# Patient Record
Sex: FEMALE | Race: WHITE | NOT HISPANIC OR LATINO | Employment: OTHER | URBAN - METROPOLITAN AREA
[De-identification: names, ages, dates, MRNs, and addresses within clinical notes are randomized per-mention and may not be internally consistent; named-entity substitution may affect disease eponyms.]

---

## 2022-11-02 PROBLEM — G47.33 OBSTRUCTIVE SLEEP APNEA OF ADULT: Chronic | Status: ACTIVE | Noted: 2022-11-02

## 2022-11-02 PROBLEM — I10 HTN (HYPERTENSION): Status: ACTIVE | Noted: 2022-11-02

## 2022-11-02 PROBLEM — K76.0 FATTY LIVER: Status: ACTIVE | Noted: 2022-11-02

## 2022-11-02 PROBLEM — I10 HTN (HYPERTENSION): Chronic | Status: ACTIVE | Noted: 2022-11-02

## 2022-11-02 PROBLEM — I70.0 ATHEROSCLEROSIS OF AORTA (HCC): Status: ACTIVE | Noted: 2022-11-02

## 2022-11-02 PROBLEM — Z98.890 HISTORY OF EXTRACTION OF RENAL CALCULUS: Status: ACTIVE | Noted: 2022-11-02

## 2022-11-02 PROBLEM — Z01.84: Status: ACTIVE | Noted: 2022-11-02

## 2022-11-02 PROBLEM — E66.811 OBESITY (BMI 30.0-34.9): Status: ACTIVE | Noted: 2022-11-02

## 2022-11-02 PROBLEM — L30.4 INTERTRIGO: Status: ACTIVE | Noted: 2022-11-02

## 2022-11-02 PROBLEM — E11.9 DM (DIABETES MELLITUS), TYPE 2 (HCC): Status: ACTIVE | Noted: 2022-11-02

## 2022-11-02 PROBLEM — E55.9 VITAMIN D DEFICIENCY: Status: ACTIVE | Noted: 2022-11-02

## 2022-11-02 PROBLEM — R53.81 PHYSICAL DEBILITY: Status: ACTIVE | Noted: 2022-11-02

## 2022-11-02 PROBLEM — M85.80 OSTEOPENIA: Status: ACTIVE | Noted: 2022-11-02

## 2022-11-02 PROBLEM — M17.10 ARTHRITIS OF KNEE: Status: ACTIVE | Noted: 2022-11-02

## 2022-11-02 PROBLEM — K86.2 PANCREATIC CYST: Status: ACTIVE | Noted: 2022-11-02

## 2022-11-02 PROBLEM — E11.9 DM (DIABETES MELLITUS), TYPE 2 (HCC): Chronic | Status: ACTIVE | Noted: 2022-11-02

## 2022-11-02 PROBLEM — M79.2 NEURITIS: Status: ACTIVE | Noted: 2022-11-02

## 2022-11-02 PROBLEM — M20.10 ACQUIRED HALLUX VALGUS: Status: ACTIVE | Noted: 2022-11-02

## 2022-11-02 PROBLEM — R32 URINARY INCONTINENCE: Status: ACTIVE | Noted: 2022-11-02

## 2022-11-02 PROBLEM — Z85.3 HISTORY OF LEFT BREAST CANCER: Status: ACTIVE | Noted: 2022-11-02

## 2022-11-02 PROBLEM — E66.9 OBESITY (BMI 30.0-34.9): Status: ACTIVE | Noted: 2022-11-02

## 2022-11-02 PROBLEM — Z87.442 HISTORY OF EXTRACTION OF RENAL CALCULUS: Status: ACTIVE | Noted: 2022-11-02

## 2022-11-02 PROBLEM — G47.33 OBSTRUCTIVE SLEEP APNEA OF ADULT: Status: ACTIVE | Noted: 2022-11-02

## 2022-11-02 PROBLEM — F33.42 MAJOR DEPRESSIVE DISORDER, RECURRENT EPISODE, IN FULL REMISSION (HCC): Status: ACTIVE | Noted: 2022-11-02

## 2023-04-20 PROBLEM — F02.80 ALZHEIMER'S DISEASE (HCC): Status: ACTIVE | Noted: 2021-03-08

## 2023-04-20 PROBLEM — G30.9 ALZHEIMER'S DISEASE (HCC): Status: ACTIVE | Noted: 2021-03-08

## 2023-05-31 ENCOUNTER — HOSPITAL ENCOUNTER (OUTPATIENT)
Facility: MEDICAL CENTER | Age: 88
End: 2023-05-31
Attending: PHYSICIAN ASSISTANT
Payer: MEDICARE

## 2023-05-31 LAB
FORWARD REASON: SPWHY: NORMAL
FORWARDED TO LAB: SPWHR: NORMAL
SPECIMEN SENT: SPWT1: NORMAL

## 2023-06-10 ENCOUNTER — APPOINTMENT (OUTPATIENT)
Dept: RADIOLOGY | Facility: MEDICAL CENTER | Age: 88
End: 2023-06-10
Attending: EMERGENCY MEDICINE
Payer: MEDICARE

## 2023-06-10 ENCOUNTER — HOSPITAL ENCOUNTER (EMERGENCY)
Facility: MEDICAL CENTER | Age: 88
End: 2023-06-10
Attending: EMERGENCY MEDICINE
Payer: MEDICARE

## 2023-06-10 VITALS
BODY MASS INDEX: 36.31 KG/M2 | RESPIRATION RATE: 18 BRPM | WEIGHT: 173 LBS | TEMPERATURE: 97.5 F | DIASTOLIC BLOOD PRESSURE: 75 MMHG | SYSTOLIC BLOOD PRESSURE: 135 MMHG | HEIGHT: 58 IN | HEART RATE: 75 BPM | OXYGEN SATURATION: 97 %

## 2023-06-10 DIAGNOSIS — M25.551 RIGHT HIP PAIN: ICD-10-CM

## 2023-06-10 DIAGNOSIS — S12.041A CLOSED NONDISPLACED LATERAL MASS FRACTURE OF FIRST CERVICAL VERTEBRA, INITIAL ENCOUNTER (HCC): ICD-10-CM
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
## 2023-06-10 DIAGNOSIS — S20.211A CHEST WALL CONTUSION, RIGHT, INITIAL ENCOUNTER: ICD-10-CM

## 2023-06-10 PROCEDURE — 99284 EMERGENCY DEPT VISIT MOD MDM: CPT

## 2023-06-10 PROCEDURE — 73501 X-RAY EXAM HIP UNI 1 VIEW: CPT | Mod: RT

## 2023-06-10 PROCEDURE — 73700 CT LOWER EXTREMITY W/O DYE: CPT | Mod: RT

## 2023-06-10 PROCEDURE — 72125 CT NECK SPINE W/O DYE: CPT

## 2023-06-10 PROCEDURE — 70450 CT HEAD/BRAIN W/O DYE: CPT

## 2023-06-10 PROCEDURE — 71045 X-RAY EXAM CHEST 1 VIEW: CPT

## 2023-06-10 NOTE — ED PROVIDER NOTES
ED Provider Note    CHIEF COMPLAINT  Chief Complaint   Patient presents with    T-5000 GLF     PT resides at [facility], had witnessed, mechanical GLF. + Head strike, -LOC, -blood thinners. PT Aox2 which is baseline. FSBS 385 per EMS, hx DM2.       EXTERNAL RECORDS REVIEWED  Outpatient office visit 5/25/2023, routine checkup, history of Alzheimer's and breast cancer, medications include no anticoagulants    HPI/ROS  LIMITATION TO HISTORY   Select: History of dementia  OUTSIDE HISTORIAN(S):  EMS      Lindy Palmer is a 88 y.o. female who presents for evaluation status post witnessed ground-level fall, fell to the right side complaining of headache and neck pain.  EMS reports they also found her to have some right-sided chest wall tenderness and right hip pain.  She has a history of dementia residing at a memory care facility, oriented x2 at her baseline.  She is not on anticoagulants nor aspirin.  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    SURGICAL HISTORY  patient denies any surgical history    FAMILY HISTORY  No family history on file.    SOCIAL HISTORY  Social History     Tobacco Use    Smoking status: Unknown    Smokeless tobacco: Not on file   Substance and Sexual Activity    Alcohol use: Not on file    Drug use: Not on file    Sexual activity: Not on file       CURRENT  "MEDICATIONS  Home Medications    **Home medications have not yet been reviewed for this encounter**         ALLERGIES  Allergies   Allergen Reactions    Trazodone Palpitations       PHYSICAL EXAM  VITAL SIGNS: BP (!) 146/73   Pulse 84   Temp 36.1 °C (97 °F) (Temporal)   Resp 18   Ht 1.473 m (4' 10\")   Wt 78.5 kg (173 lb)   SpO2 92%   BMI 36.16 kg/m²   Constitutional: Awake, alert, no acute distress.  HENT: Head is atraumatic.  Neck: Collar is in place, has some midline tenderness  Thorax: No respiratory distress.  Lungs are clear to auscultation with symmetric air movement, regular cardiac rhythm.  On palpation she does have some tenderness of the right lateral chest wall but no crepitation.  Abdomen: Soft, with no tenderness.  Extremities/Musculoskeletal: No obvious sign of acute deformities.  She does have some pain in the right hip when attempting to move that right leg.  Neurovascular intact x4 extremities  Neurologic: Awake, alert.  Limited range of motion of all joints with generalized weakness, does seem to have right upper extremity weakness versus left    DIAGNOSTIC STUDIES / PROCEDURES    RADIOLOGY  I have independently interpreted the diagnostic imaging associated with this visit and am waiting the final reading from the radiologist.   My preliminary interpretation is as follows: No intracranial hemorrhage  Radiologist interpretation:   CT-HIP W/O PLUS RECONS RIGHT   Final Result      1.  No displaced pelvic or proximal femur fracture.   2.  Mild degenerative changes in the pelvis and lower lumbar spine.   3.  There is a fat-containing lower abdominal wall hernia.   4.  There is diverticulosis without diverticulitis.      DX-HIP-UNILATERAL-WITH PELVIS-1 VIEW RIGHT   Final Result      1.  Bones are osteopenic.   2.  No acute displaced fractures are identified.      DX-CHEST-PORTABLE (1 VIEW)   Final Result      1.  There is no acute cardiopulmonary process.      CT-CSPINE WITHOUT PLUS RECONS   Final " Result      1.  Nondisplaced fracture of the right lateral C1 arch.      2.  Multilevel degenerative disc disease and facet degeneration.      3.  This was discussed with ROJELIO HUANG at 2:35 PM on 6/10/2023.      CT-HEAD W/O   Final Result   Addendum (preliminary) 1 of 1   Addendum:   Discontinuity of the posterior right arch of C1 is noted, age    indeterminate. This will be discussed in further detail in the cervical    spine CT.      Final      1.  There is no acute intracranial hemorrhage or infarct.      2.  White matter lucencies most consistent with small vessel ischemic change versus demyelination or gliosis.      3.  There is cerebral atrophy.               COURSE & MEDICAL DECISION MAKING    ED Observation Status? No; Patient does not meet criteria for ED Observation.     INITIAL ASSESSMENT, COURSE AND PLAN  Care Narrative: 88-year-old history of dementia with a ground-level fall that was witnessed, head injury, also complaining of neck pain arrives in a c-collar.  A code TBI.  On exam also has right-sided chest wall tenderness and some painful range of motion of the right hip.  CT of the head and C-spine be obtained.  X-ray of the chest and hip will be obtained.  Ultimately she will require a tertiary examination to identify other injuries prior to disposition    CT of the head is negative.  CT of the C-spine reveals a nondisplaced unilateral fracture of C1.  I did again perform a neurologic semination and given the patient's limited range of motion of essentially all of her joints plus her generalized weakness is really hard to identify any obvious focal neurologic deficits although she does seem to have some right arm weakness.  I discussed the CT findings with Dr. Tse, is very unlikely that a fracture of that pattern would cause significant injury to his spinal cord.  He recommends 3 months of hard collar immobilization.  The meantime the x-ray of the chest and hip were unremarkable, given her  poor bone density noted on the x-ray a CT of the hip was obtained and this is negative for fracture.  Afterwards the patient was able to stand up on her own.  Hard collar has been placed by traction.  She will follow-up with the spine surgeon.  Discharged home in stable condition with strict return instructions provided      DISPOSITION AND DISCUSSIONS  I have discussed management of the patient with the following physicians and REN's: Dr. Tse, spine surgery    Escalation of care considered, and ultimately not performed:acute inpatient care management, however at this time, the patient is most appropriate for outpatient management    Decision tools and prescription drugs considered including, but not limited to: I did consider prescription for opiate analgesics although the patient seems quite comfortable at this time I think the risks outweigh the benefits.    FINAL DIAGNOSIS  1. Closed nondisplaced lateral mass fracture of first cervical vertebra, initial encounter ([facility])    2. Ground-level fall    3. Chest wall contusion, right, initial encounter    4. Right hip pain           Electronically signed by: Ozzy Naranjo M.D., 6/10/2023 2:39 PM

## 2023-06-10 NOTE — PROGRESS NOTES
Images reviewed, non-displaced C1 arch fracture, normal alignment including C1/2 lateral masses, recommend C-collar x 3 months and outpatient [facility] spine ([phone] for appt) at 1 and 3 months for serial clinical and xray evaluation followup.

## 2023-06-10 NOTE — ED TRIAGE NOTES
"Chief Complaint   Patient presents with    T-5000 GLF     PT resides at [facility], had witnessed, mechanical GLF. + Head strike, -LOC, -blood thinners. PT Aox2 which is baseline. FSBS 385 per EMS, hx DM2.       BIB EMS to [facility], pt on monitor, provided call bell.  Medications given en route: None    BP (!) 146/73   Pulse 84   Temp 36.1 °C (97 °F) (Temporal)   Resp 18   Ht 1.473 m (4' 10\")   Wt 78.5 kg (173 lb)   SpO2 92%   BMI 36.16 kg/m²     "

## 2023-06-11 NOTE — ED NOTES
Pt stood at bedside, pt reports no complaints. ERP made aware. Social work made aware of pts need for transfer to [facility].

## 2023-06-11 NOTE — ED NOTES
Pt ready for DC, family at bedside for transport home, pt to be transferred by wheelchair to exit, discharge questions answered

## 2023-06-11 NOTE — PROGRESS NOTES
Sent a fax to [employer] regarding the order for a custom C-collar, will try to expedite. Contact them at [phone].

## 2023-07-01 ENCOUNTER — HOSPITAL ENCOUNTER (EMERGENCY)
Facility: MEDICAL CENTER | Age: 88
End: 2023-07-01
Attending: EMERGENCY MEDICINE
Payer: MEDICARE

## 2023-07-01 VITALS
TEMPERATURE: 97.3 F | RESPIRATION RATE: 16 BRPM | DIASTOLIC BLOOD PRESSURE: 55 MMHG | HEIGHT: 58 IN | HEART RATE: 76 BPM | WEIGHT: 173.06 LBS | SYSTOLIC BLOOD PRESSURE: 109 MMHG | OXYGEN SATURATION: 93 % | BODY MASS INDEX: 36.33 KG/M2

## 2023-07-01 DIAGNOSIS — K62.5 BRBPR (BRIGHT RED BLOOD PER RECTUM): ICD-10-CM

## 2023-07-01 LAB
ALBUMIN SERPL BCP-MCNC: 4 G/DL (ref 3.2–4.9)
ALBUMIN/GLOB SERPL: 1.2 G/DL
ALP SERPL-CCNC: 79 U/L (ref 30–99)
ALT SERPL-CCNC: 22 U/L (ref 2–50)
ANION GAP SERPL CALC-SCNC: 16 MMOL/L (ref 7–16)
APTT PPP: 27.3 SEC (ref 24.7–36)
AST SERPL-CCNC: 22 U/L (ref 12–45)
BASOPHILS # BLD AUTO: 0.8 % (ref 0–1.8)
BASOPHILS # BLD: 0.07 K/UL (ref 0–0.12)
BILIRUB SERPL-MCNC: 0.7 MG/DL (ref 0.1–1.5)
BUN SERPL-MCNC: 14 MG/DL (ref 8–22)
CALCIUM ALBUM COR SERPL-MCNC: 9.7 MG/DL (ref 8.5–10.5)
CALCIUM SERPL-MCNC: 9.7 MG/DL (ref 8.5–10.5)
CHLORIDE SERPL-SCNC: 99 MMOL/L (ref 96–112)
CO2 SERPL-SCNC: 21 MMOL/L (ref 20–33)
CREAT SERPL-MCNC: 1.14 MG/DL (ref 0.5–1.4)
EOSINOPHIL # BLD AUTO: 0.14 K/UL (ref 0–0.51)
EOSINOPHIL NFR BLD: 1.6 % (ref 0–6.9)
ERYTHROCYTE [DISTWIDTH] IN BLOOD BY AUTOMATED COUNT: 41.5 FL (ref 35.9–50)
GFR SERPLBLD CREATININE-BSD FMLA CKD-EPI: 46 ML/MIN/1.73 M 2
GLOBULIN SER CALC-MCNC: 3.3 G/DL (ref 1.9–3.5)
GLUCOSE SERPL-MCNC: 287 MG/DL (ref 65–99)
HCT VFR BLD AUTO: 42.7 % (ref 37–47)
HGB BLD-MCNC: 14.9 G/DL (ref 12–16)
IMM GRANULOCYTES # BLD AUTO: 0.03 K/UL (ref 0–0.11)
IMM GRANULOCYTES NFR BLD AUTO: 0.3 % (ref 0–0.9)
INR PPP: 1.09 (ref 0.87–1.13)
LYMPHOCYTES # BLD AUTO: 1.81 K/UL (ref 1–4.8)
LYMPHOCYTES NFR BLD: 20.7 % (ref 22–41)
MCH RBC QN AUTO: 31.6 PG (ref 27–33)
MCHC RBC AUTO-ENTMCNC: 34.9 G/DL (ref 32.2–35.5)
MCV RBC AUTO: 90.7 FL (ref 81.4–97.8)
MONOCYTES # BLD AUTO: 0.65 K/UL (ref 0–0.85)
MONOCYTES NFR BLD AUTO: 7.4 % (ref 0–13.4)
NEUTROPHILS # BLD AUTO: 6.05 K/UL (ref 1.82–7.42)
NEUTROPHILS NFR BLD: 69.2 % (ref 44–72)
NRBC # BLD AUTO: 0 K/UL
NRBC BLD-RTO: 0 /100 WBC (ref 0–0.2)
PLATELET # BLD AUTO: 211 K/UL (ref 164–446)
PMV BLD AUTO: 10.8 FL (ref 9–12.9)
POTASSIUM SERPL-SCNC: 4.1 MMOL/L (ref 3.6–5.5)
PROT SERPL-MCNC: 7.3 G/DL (ref 6–8.2)
PROTHROMBIN TIME: 14 SEC (ref 12–14.6)
RBC # BLD AUTO: 4.71 M/UL (ref 4.2–5.4)
SODIUM SERPL-SCNC: 136 MMOL/L (ref 135–145)
WBC # BLD AUTO: 8.8 K/UL (ref 4.8–10.8)

## 2023-07-01 PROCEDURE — 85730 THROMBOPLASTIN TIME PARTIAL: CPT

## 2023-07-01 PROCEDURE — 80053 COMPREHEN METABOLIC PANEL: CPT

## 2023-07-01 PROCEDURE — 85610 PROTHROMBIN TIME: CPT

## 2023-07-01 PROCEDURE — 85025 COMPLETE CBC W/AUTO DIFF WBC: CPT

## 2023-07-01 PROCEDURE — 99284 EMERGENCY DEPT VISIT MOD MDM: CPT

## 2023-07-01 PROCEDURE — 94760 N-INVAS EAR/PLS OXIMETRY 1: CPT

## 2023-07-01 PROCEDURE — 36415 COLL VENOUS BLD VENIPUNCTURE: CPT

## 2023-07-01 NOTE — ED PROVIDER NOTES
ED Provider Note    CHIEF COMPLAINT  Chief Complaint   Patient presents with    Bloody Stools     Pt had bowel incontinence this morning with scant blood in it.         EXTERNAL RECORDS REVIEWED  Outpatient Notes the patient was seen in the emergency department on Whitney 10, 2023 and at that time was diagnosed after ground-level fall with a nondisplaced C1 fracture    HPI/ROS  LIMITATION TO HISTORY   Select: Forgetfulness  OUTSIDE HISTORIAN(S):  Staff at [facility]    Lindy Palmer is a 88 y.o. female who presents after being transferred from [facility], she had a bowel movement today with incontinence and there was a scant amount of blood in the feces.  The patient is a poor historian and does not know why she is here.  She denies any symptoms currently.  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    SURGICAL HISTORY  patient denies any surgical history    FAMILY HISTORY  No family history on file.    SOCIAL HISTORY  Social History     Tobacco Use    Smoking status: Unknown    Smokeless tobacco: Not on file   Substance and Sexual Activity    Alcohol use: Not on file    Drug use: Not on file    Sexual activity: Not on file       CURRENT MEDICATIONS     Start End   nystatin (MYCOSTATIN) powder 6/7/2023    Sig: Apply 1 g topically 3 times a day. Discontinue when rash is  resolved   Route: Topical   Cosign for Ordering: Accepted by Karen Kinsey P.A.-C. on 2023  3:23 PM   Number of times this order has been changed since signin     Order Audit Trail     Probiotic, Lactobacillus, Cap 2023   Sig: Take 1 Capsule by mouth 2 times a day for 30 days.   Route: Oral   Cosign for Ordering: Accepted by Karen Kinsey P.A.-C. on 2023  5:31 PM   Number of times this order has been changed since signin     Order Audit Trail     ondansetron (ZOFRAN) 4 MG Tab tablet 2023   Sig: Take 1 Tablet by mouth every 6 hours as needed for Nausea/Vomiting for up to 90 days.   Route: Oral   Number of times this order has been changed since signin     Order Audit Trail     loperamide (IMODIUM) 2 MG Cap 2023    Sig: Take 1 Capsule by mouth 4 times a day as needed for Diarrhea.   Route: Oral   Number of times this order has been changed since signin     Order Audit [place]     acetaminophen (TYLENOL) 500 MG Tab     Sig: Take 1,000 mg by mouth every 8 hours as needed for Mild Pain or Moderate Pain.   Class: Historical Med   Route: Oral   Calcium Carbonate Antacid (TUMS PO)     Sig: Take 2 Tablets by mouth every 12 hours as needed (dyspepsia).   Class: Historical Med   Route: Oral   lisinopril (PRINIVIL) 2.5 MG Tab 2023   Sig: Take 1 Tablet by mouth every day for 180 days.   Route: Oral   Number of times this order has been changed since signin     Order Audit [place]     Non Formulary Request     Sig: Take 1 Packet by mouth every day. Ningxia Red one 2oz packet PO daily. Family provides medication.   Class: Historical Med   Route: Oral   polyethylene glycol 3350 (MIRALAX) 17 GM/SCOOP Powder 2022    Sig: MIX 17G IN 8OZ OF H20 AND TAKE BY MOUTH EVERY DAY AS NEEDED FOR CONSTIPATION   Cosign for Ordering: Accepted by Karen Kinsey P.A.-C. on 2022  3:33 PM   Number of times this order has been changed since signin     Order Audit [place]    "  B Complex Vitamins (VITAMIN B COMPLEX) Tab     Sig: Take  by mouth. Take 1 by mouth daily   Class: Historical Med   Route: Oral   Ascorbic Acid (VITAMIN C) 1000 MG Tab     Sig: Take 1,000 mg by mouth. Take 1 by mouth daily   Class: Historical Med   Route: Oral   Cholecalciferol (VITAMIN D) 2000 UNIT Tab     Sig: Take  by mouth every day. Take 1 by mouth daily   Class: Historical Med   Route: Oral   Cananga Oil Fragrance (YLANG-YLANG OIL FRAGRANCE) Oil     Sig: Place 2 Drops on the skin two times a week.   Class: Historical Med   Route: Transdermal   Omega-3 Fatty Acids (OMEGA-3 EPA FISH OIL) 1205 MG Cap     Sig: Take 2 Capsules by mouth every day.   Class: Historical Med   Route: Oral         ALLERGIES  Allergies   Allergen Reactions    Trazodone Palpitations       PHYSICAL EXAM  VITAL SIGNS: /55   Pulse 79   Temp 36.7 °C (98 °F) (Temporal)   Resp 15   Ht 1.473 m (4' 9.99\")   Wt 78.5 kg (173 lb 1 oz)   SpO2 90%   BMI 36.18 kg/m²    Constitutional: Alert.  HENT: No signs of trauma, Bilateral external ears normal, Nose normal.   Eyes: Pupils are equal and reactive, Conjunctiva normal, Non-icteric.   Neck: Normal range of motion, No tenderness, Supple, No stridor.   Lymphatic: No lymphadenopathy noted.   Cardiovascular: Regular rate and rhythm, no murmurs.   Thorax & Lungs: Normal breath sounds, No respiratory distress, No wheezing, No chest tenderness.   Abdomen: Bowel sounds normal, Soft, No tenderness, No peritoneal signs, No masses, No pulsatile masses.   Skin: Warm, Dry, No erythema, No rash.   Back: No bony tenderness, No CVA tenderness.   Extremities: Intact distal pulses, No edema, No tenderness, No cyanosis.  Musculoskeletal: Good range of motion in all major joints. No tenderness to palpation or major deformities noted.   Neurologic: Alert , Normal motor function, Normal sensory function, No focal deficits noted.   Psychiatric: Affect normal, Judgment normal, Mood normal.       DIAGNOSTIC " STUDIES / PROCEDURES    LABS  Labs Reviewed   CBC WITH DIFFERENTIAL - Abnormal; Notable for the following components:       Result Value    Lymphocytes 20.70 (*)     All other components within normal limits    Narrative:     Indicate which anticoagulants the patient is on:->UNKNOWN   COMP METABOLIC PANEL - Abnormal; Notable for the following components:    Glucose 287 (*)     All other components within normal limits    Narrative:     Indicate which anticoagulants the patient is on:->UNKNOWN   ESTIMATED GFR - Abnormal; Notable for the following components:    GFR (CKD-EPI) 46 (*)     All other components within normal limits    Narrative:     Indicate which anticoagulants the patient is on:->UNKNOWN   PROTHROMBIN TIME    Narrative:     Indicate which anticoagulants the patient is on:->UNKNOWN   APTT    Narrative:     Indicate which anticoagulants the patient is on:->UNKNOWN   CORRECTED CALCIUM    Narrative:     Indicate which anticoagulants the patient is on:->UNKNOWN           COURSE & MEDICAL DECISION MAKING    ED Observation Status? Yes; I am placing the patient in to an observation status due to a diagnostic uncertainty as well as therapeutic intensity. Patient placed in observation status at 11:01 AM, 7/1/2023.     Observation plan is as follows: Patient presents with a history of a small amount of blood in her stool.  Labs were ordered to assess.  She will be monitored.    Upon Reevaluation, the patient's condition has: Improved; and will be discharged.    Patient discharged from ED Observation status at 11:54 AM (Time) July 1, 2023 (Date).     INITIAL ASSESSMENT, COURSE AND PLAN  Care Narrative: The patient's labs are unremarkable except for slightly elevated glucose with no evidence of DKA.  At this point the patient is stable to be discharged.  I will give her the phone number of the 2 GI groups in [facility] if the patient would like to further investigate with colonoscopy.  The patient is had no bowel  movements here in the emergency department.  Her vital signs have been stable.        ADDITIONAL PROBLEM LIST  Hypertension, arthritis, diabetes  DISPOSITION AND DISCUSSIONS  I have discussed management of the patient with the following physicians and REN's: None    Discussion of management with other [facility] or appropriate source(s): None     Escalation of care considered, and ultimately not performed:acute inpatient care management, however at this time, the patient is most appropriate for outpatient management    Barriers to care at this time, including but not limited to:  None .     Decision tools and prescription drugs considered including, but not limited to:  None .    The patient will return for new or worsening symptoms and is stable at the time of discharge.    The patient is referred to a primary physician for blood pressure management, diabetic screening, and for all other preventative health concerns.      DISPOSITION:  Patient will be discharged home in stable condition.    FOLLOW UP:  [facility], Emergency Dept  [address] [address] [phone]  Follow up  If symptoms worsen    Karen Kinsey P.A.-C.  [address] [facility] [address] [phone]    Follow up  As needed    [facility]  [address] [place] [id number] [phone]  Follow up  Call for follow-up    [facility]  [address] [facility] [address] [phone]  Follow up  Call for follow-up      OUTPATIENT MEDICATIONS:  New Prescriptions    No medications on file         FINAL DIAGNOSIS  1. BRBPR (bright red blood per rectum)           Electronically signed by: Sukhi May M.D., 7/1/2023 11:01 AM

## 2023-07-01 NOTE — ED TRIAGE NOTES
Chief Complaint   Patient presents with    Bloody Stools     Pt had bowel incontinence this morning with scant blood in it.     Pt BIBA for above complaint. EMS reports there was very little omari blood in stool, however daughter wanted her to be evaluated.  Pt lives in SNF, is A+Ox3 on RA which is baseline.  Pt denies pain.

## 2023-07-01 NOTE — ED NOTES
"Pt discharged to home via wheelchair with daughter.  Pt alert and oriented times 4 on room air.  IV discontinued and gauze placed, pt in possession of belongings.  Pt provided discharge education and information pertaining to medications and follow up appointments.  Pt received copy of discharge instructions and verbalized understanding. Encouraged to follow up with PCP. /55   Pulse 76   Temp 36.3 °C (97.3 °F) (Temporal)   Resp 16   Ht 1.473 m (4' 9.99\")   Wt 78.5 kg (173 lb 1 oz)   SpO2 93%   BMI 36.18 kg/m²      "

## 2023-07-01 NOTE — ED NOTES
Pt is resting comfortably in [facility].  Blankets provided upon request.  Denies needs at this time.

## 2023-07-01 NOTE — DISCHARGE PLANNING
ER  Note:  Received information that pt is medically cleared to discharge back to [facility] ([phone]). Call transferred to Marianne, Resident Care Coordinator. Confirmed pt is their resident, confirmed address [address] [place] [id number]. Per Marianne, they do not have transportation. Call transferred to bedside RN per Marianne's request.     RN CM called pt's daughter, Lexie. Per Lexie, she will  pt in about 30 minutes. Bedside RN and Marianne from [place] updated.

## 2023-07-01 NOTE — ED NOTES
Spoke with [facility], gave update on POC and report on pt.  Daughter to  pt around 12:30

## 2023-07-06 PROBLEM — S12.000A FRACTURE OF C1 VERTEBRA, CLOSED (HCC): Status: ACTIVE | Noted: 2023-07-06

## 2023-07-06 PROBLEM — K62.5 RECTAL BLEEDING: Status: ACTIVE | Noted: 2023-07-06

## 2023-08-10 PROBLEM — N18.30 STAGE 3 CHRONIC KIDNEY DISEASE: Status: ACTIVE | Noted: 2023-08-10

## 2023-10-26 ENCOUNTER — HOSPITAL ENCOUNTER (OUTPATIENT)
Facility: MEDICAL CENTER | Age: 88
End: 2023-10-26
Attending: PHYSICIAN ASSISTANT
Payer: MEDICARE

## 2023-10-26 DIAGNOSIS — R30.0 DYSURIA: ICD-10-CM

## 2023-10-26 LAB
FORWARD REASON: SPWHY: NORMAL
FORWARDED TO LAB: SPWHR: NORMAL
SPECIMEN SENT: SPWT1: NORMAL

## 2023-12-08 ENCOUNTER — HOSPITAL ENCOUNTER (OUTPATIENT)
Facility: MEDICAL CENTER | Age: 88
End: 2023-12-08
Attending: PHYSICIAN ASSISTANT
Payer: MEDICARE

## 2023-12-08 DIAGNOSIS — E55.9 VITAMIN D DEFICIENCY: ICD-10-CM

## 2023-12-08 DIAGNOSIS — N18.30 STAGE 3 CHRONIC KIDNEY DISEASE, UNSPECIFIED WHETHER STAGE 3A OR 3B CKD: ICD-10-CM

## 2023-12-08 DIAGNOSIS — E11.65 TYPE 2 DIABETES MELLITUS WITH HYPERGLYCEMIA, WITHOUT LONG-TERM CURRENT USE OF INSULIN (HCC): Chronic | ICD-10-CM

## 2023-12-08 DIAGNOSIS — R30.0 DYSURIA: ICD-10-CM

## 2023-12-08 DIAGNOSIS — G47.33 OBSTRUCTIVE SLEEP APNEA OF ADULT: Chronic | ICD-10-CM

## 2023-12-08 DIAGNOSIS — I10 HYPERTENSION, UNSPECIFIED TYPE: Chronic | ICD-10-CM

## 2023-12-08 DIAGNOSIS — E11.69 HYPERLIPIDEMIA DUE TO TYPE 2 DIABETES MELLITUS (HCC): ICD-10-CM

## 2023-12-08 DIAGNOSIS — E78.5 HYPERLIPIDEMIA DUE TO TYPE 2 DIABETES MELLITUS (HCC): ICD-10-CM

## 2023-12-08 DIAGNOSIS — K62.5 RECTAL BLEEDING: ICD-10-CM

## 2023-12-08 LAB
FORWARD REASON: SPWHY: NORMAL
FORWARDED TO LAB: SPWHR: NORMAL
SPECIMEN SENT (2ND): SPWT2: NORMAL
SPECIMEN SENT (3RD): SPWT3: NORMAL
SPECIMEN SENT (4TH): SPWT4: NORMAL
SPECIMEN SENT: SPWT1: NORMAL

## 2023-12-09 ENCOUNTER — OFFICE VISIT (OUTPATIENT)
Dept: URGENT CARE | Facility: CLINIC | Age: 88
End: 2023-12-09
Payer: MEDICARE

## 2023-12-09 ENCOUNTER — APPOINTMENT (OUTPATIENT)
Dept: RADIOLOGY | Facility: IMAGING CENTER | Age: 88
End: 2023-12-09
Payer: MEDICARE

## 2023-12-09 VITALS
TEMPERATURE: 97.8 F | OXYGEN SATURATION: 96 % | HEIGHT: 58 IN | DIASTOLIC BLOOD PRESSURE: 72 MMHG | BODY MASS INDEX: 35.68 KG/M2 | SYSTOLIC BLOOD PRESSURE: 134 MMHG | HEART RATE: 88 BPM | WEIGHT: 170 LBS | RESPIRATION RATE: 16 BRPM
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
## 2023-12-09 DIAGNOSIS — S69.91XA INJURY OF RIGHT WRIST, INITIAL ENCOUNTER: ICD-10-CM

## 2023-12-09 PROCEDURE — 73110 X-RAY EXAM OF WRIST: CPT | Mod: TC,RT | Performed by: RADIOLOGY

## 2023-12-09 PROCEDURE — 3075F SYST BP GE 130 - 139MM HG: CPT

## 2023-12-09 PROCEDURE — 99203 OFFICE O/P NEW LOW 30 MIN: CPT

## 2023-12-09 PROCEDURE — 3078F DIAST BP <80 MM HG: CPT

## 2023-12-09 ASSESSMENT — ENCOUNTER SYMPTOMS
FALLS: 1
LOSS OF CONSCIOUSNESS: 0
SHORTNESS OF BREATH: 0
VOMITING: 0
NAUSEA: 0

## 2023-12-09 ASSESSMENT — FIBROSIS 4 INDEX: FIB4 SCORE: 1.96

## 2023-12-09 NOTE — PROGRESS NOTES
Subjective:     CHIEF COMPLAINT  Chief Complaint   Patient presents with    Wrist Injury     Sx (R) ground level fall in kitchen x  today        HPI  Lindy Palmer is a very pleasant 88 y.o. female with a history of Alzheimer's who presents accompanied by her daughter/primary historian with right wrist pain.  She had a fall earlier today in the kitchen at her assisted living facility.  The fall was not witnessed, but she was wearing non-stick socks without shoes.  She is uncertain whether she hit her head.  She has not had any nausea or vomiting or changes in behavior since this incident.  She has been complaining of right wrist pain localized to the distal radial region.  She otherwise is feeling well.    REVIEW OF SYSTEMS  Review of Systems   Respiratory:  Negative for shortness of breath.    Cardiovascular:  Negative for chest pain.   Gastrointestinal:  Negative for nausea and vomiting.   Musculoskeletal:  Positive for falls and joint pain (R wrist).   Neurological:  Negative for loss of consciousness.       PAST MEDICAL HISTORY  Patient Active Problem List    Diagnosis Date Noted    Stage 3 chronic kidney disease ([facility]) 08/10/2023    Rectal bleeding 07/06/2023    Fracture of C1 vertebra, closed ([facility]) 07/06/2023    Pancreatic cyst 11/02/2022    Atherosclerosis of aorta ([facility]) 11/02/2022    DM (diabetes mellitus), type 2 ([facility]) 11/02/2022    HTN (hypertension) 11/02/2022    Vitamin D deficiency 11/02/2022    Arthritis of knee 11/02/2022    Neuritis 11/02/2022    Acquired hallux valgus 11/02/2022    Urinary incontinence 11/02/2022    Obesity (BMI 30.0-34.9) 11/02/2022    History of left breast cancer 11/02/2022    Osteopenia 11/02/2022    History of extraction of renal calculus 11/02/2022    Fatty liver 11/02/2022    Immunity to hepatitis A virus determined by serologic test 11/02/2022    Obstructive sleep apnea of adult 11/02/2022    Major depressive disorder, recurrent episode, in full remission ([facility])  11/02/2022    Physical debility 11/02/2022    Intertrigo 11/02/2022    Alzheimer's disease (HCC) 03/08/2021    Bilateral primary osteoarthritis of knee 05/05/2015    History of renal calculi 06/18/2014    Solitary pulmonary nodule 11/20/2012    Hyperlipidemia due to type 2 diabetes mellitus (HCC) 06/27/2003       SURGICAL HISTORY  patient denies any surgical history    ALLERGIES  Allergies   Allergen Reactions    Trazodone Palpitations       CURRENT MEDICATIONS  Home Medications       Reviewed by April Connell P.A.-C. (Physician Assistant) on 12/09/23 at 1521  Med List Status: <None>     Medication Last Dose Status   acetaminophen (TYLENOL) 500 MG Tab  Active   asa/apap/caffeine (EXCEDRIN) 250-250-65 MG Tab  Active   Ascorbic Acid (VITAMIN C) 1000 MG Tab  Active   B Complex Vitamins (VITAMIN B COMPLEX) Tab  Active   Calcium Carbonate Antacid (TUMS PO)  Active   [place] Oil Fragrance (YLANG-YLANG OIL FRAGRANCE) Oil  Active   Cholecalciferol (VITAMIN D) 2000 UNIT Tab  Active   Continuous Blood Gluc Sensor (FREESTYLE SALVADOR 2 SENSOR) Misc  Active   glipiZIDE (GLUCOTROL) 5 MG Tab  Active   lisinopril (PRINIVIL) 2.5 MG Tab  Active   loperamide (IMODIUM) 2 MG Cap  Active   metFORMIN (GLUCOPHAGE) 500 MG Tab  Active   Non Formulary Request  Active   Non Formulary Request  Active   Non Formulary Request  Active   nystatin (MYCOSTATIN) powder  Active   Omega-3 Fatty Acids (OMEGA-3 EPA FISH OIL) 1205 MG Cap  Active   ondansetron (ZOFRAN ODT) 4 MG TABLET DISPERSIBLE  Active   polyethylene glycol 3350 (MIRALAX) 17 GM/SCOOP Powder  Active   Vitamins A & D (VITAMIN A&D) Ointment  Active   zinc oxide (DESITIN) 40 % Ointment  Active                    SOCIAL HISTORY  Social History     Tobacco Use    Smoking status: Unknown    Smokeless tobacco: Not on file   Substance and Sexual Activity    Alcohol use: Not on file    Drug use: Not on file    Sexual activity: Not on file       FAMILY HISTORY  History reviewed. No pertinent  "family history.       Objective:     VITAL SIGNS: /72 (BP Location: Left arm, Patient Position: Sitting, BP Cuff Size: Adult)   Pulse 88   Temp 36.6 °C (97.8 °F) (Temporal)   Resp 16   Ht 1.473 m (4' 10\")   Wt 77.1 kg (170 lb)   SpO2 96%   BMI 35.53 kg/m²     PHYSICAL EXAM  Physical Exam  Vitals reviewed. Exam conducted with a chaperone present.   Constitutional:       General: She is not in acute distress.     Appearance: Normal appearance. She is not ill-appearing, toxic-appearing or diaphoretic.   HENT:      Head: Normocephalic and atraumatic.      Mouth/Throat:      Mouth: Mucous membranes are moist.   Eyes:      Conjunctiva/sclera: Conjunctivae normal.   Cardiovascular:      Rate and Rhythm: Normal rate.   Pulmonary:      Effort: Pulmonary effort is normal. No respiratory distress.   Musculoskeletal:      Right wrist: Swelling and tenderness present. No snuff box tenderness. Decreased range of motion. Normal pulse.        Arms:       Comments: Tenderness to palpation present on distal radius with mildly reduced range of motion secondary to discomfort. No snuffbox tenderness.    Skin:     General: Skin is warm and dry.      Capillary Refill: Capillary refill takes less than 2 seconds.      Findings: No bruising.   Neurological:      General: No focal deficit present.      Mental Status: She is alert.   Psychiatric:         Mood and Affect: Mood normal.       Assessment/Plan:     1. Injury of right wrist, initial encounter  - DX-WRIST-COMPLETE 3+ RIGHT; Future    2. Ground-level fall  -Tylenol OTC as needed for pain  -Rest, ice, and elevate right wrist  -Monitor symptoms at home and be seen in emergency department if any neurologic changes occur  -Wrist brace to be worn for comfort  -Return to clinic if symptoms worsen or fail to resolve    MDM/Comments:  I have prepared for this visit by personally reviewing the patient's prevous medical records, vitals, and labs including: most recent GFR of 46. " Patient has a history of CKD stage 3. Patient has a history of Alzheimer's dementia and her daughter is acting as primary historian. Patient has stable vital signs and is non-toxic appearing. Discussed supportive care with rest, ice, elevation. Use of tylenol as needed for pain. I personally reviewed X-ray images and agree with radiology's interpretation. Per my personal interpretation of films, I see no acute fracture. Patient provided a wrist brace for comfort and as a reminder to rest her wrist. Patient and daughter demonstrated understanding of treatment plan and will RTC if symptoms worsen or fail to resolve.       Differential diagnosis, natural history, supportive care, and indications for immediate follow-up discussed. All questions answered. Patient agrees with the plan of care.    Follow-up as needed if symptoms worsen or fail to improve to PCP, Urgent care or Emergency Room.    I have personally reviewed prior external notes and test results pertinent to today's visit.  I have independently reviewed and interpreted all diagnostics ordered during this urgent care acute visit.   Discussed management options (risks,benefits, and alternatives to treatment). Pt expresses understanding and the treatment plan was agreed upon. Questions were encouraged and answered to pt's satisfaction.    Please note that this dictation was created using voice recognition software. I have made a reasonable attempt to correct obvious errors, but I expect that there are errors of grammar and possibly content that I did not discover before finalizing the note.

## 2024-03-16 ENCOUNTER — HOSPITAL ENCOUNTER (OUTPATIENT)
Facility: MEDICAL CENTER | Age: 89
End: 2024-03-17
Attending: EMERGENCY MEDICINE | Admitting: INTERNAL MEDICINE
Payer: MEDICARE

## 2024-03-16 ENCOUNTER — APPOINTMENT (OUTPATIENT)
Dept: RADIOLOGY | Facility: MEDICAL CENTER | Age: 89
End: 2024-03-16
Attending: EMERGENCY MEDICINE
Payer: MEDICARE

## 2024-03-16 DIAGNOSIS — R07.9 CHEST PAIN, UNSPECIFIED TYPE: ICD-10-CM

## 2024-03-16 DIAGNOSIS — I16.0 HYPERTENSIVE URGENCY: ICD-10-CM

## 2024-03-16 LAB
ALBUMIN SERPL BCP-MCNC: 3.9 G/DL (ref 3.2–4.9)
ALBUMIN/GLOB SERPL: 1.3 G/DL
ALP SERPL-CCNC: 61 U/L (ref 30–99)
ALT SERPL-CCNC: 20 U/L (ref 2–50)
ANION GAP SERPL CALC-SCNC: 14 MMOL/L (ref 7–16)
AST SERPL-CCNC: 23 U/L (ref 12–45)
BASOPHILS # BLD AUTO: 0.6 % (ref 0–1.8)
BASOPHILS # BLD: 0.05 K/UL (ref 0–0.12)
BILIRUB SERPL-MCNC: 0.3 MG/DL (ref 0.1–1.5)
BUN SERPL-MCNC: 20 MG/DL (ref 8–22)
CALCIUM ALBUM COR SERPL-MCNC: 9.4 MG/DL (ref 8.5–10.5)
CALCIUM SERPL-MCNC: 9.3 MG/DL (ref 8.5–10.5)
CHLORIDE SERPL-SCNC: 102 MMOL/L (ref 96–112)
CO2 SERPL-SCNC: 22 MMOL/L (ref 20–33)
CREAT SERPL-MCNC: 1.05 MG/DL (ref 0.5–1.4)
EKG IMPRESSION: NORMAL
EOSINOPHIL # BLD AUTO: 0.12 K/UL (ref 0–0.51)
EOSINOPHIL NFR BLD: 1.5 % (ref 0–6.9)
ERYTHROCYTE [DISTWIDTH] IN BLOOD BY AUTOMATED COUNT: 44.5 FL (ref 35.9–50)
GFR SERPLBLD CREATININE-BSD FMLA CKD-EPI: 51 ML/MIN/1.73 M 2
GLOBULIN SER CALC-MCNC: 3.1 G/DL (ref 1.9–3.5)
GLUCOSE SERPL-MCNC: 177 MG/DL (ref 65–99)
HCT VFR BLD AUTO: 41.1 % (ref 37–47)
HGB BLD-MCNC: 13.9 G/DL (ref 12–16)
IMM GRANULOCYTES # BLD AUTO: 0.02 K/UL (ref 0–0.11)
IMM GRANULOCYTES NFR BLD AUTO: 0.2 % (ref 0–0.9)
LYMPHOCYTES # BLD AUTO: 2.48 K/UL (ref 1–4.8)
LYMPHOCYTES NFR BLD: 30 % (ref 22–41)
MCH RBC QN AUTO: 31.8 PG (ref 27–33)
MCHC RBC AUTO-ENTMCNC: 33.8 G/DL (ref 32.2–35.5)
MCV RBC AUTO: 94.1 FL (ref 81.4–97.8)
MONOCYTES # BLD AUTO: 0.63 K/UL (ref 0–0.85)
MONOCYTES NFR BLD AUTO: 7.6 % (ref 0–13.4)
NEUTROPHILS # BLD AUTO: 4.96 K/UL (ref 1.82–7.42)
NEUTROPHILS NFR BLD: 60.1 % (ref 44–72)
NRBC # BLD AUTO: 0 K/UL
NRBC BLD-RTO: 0 /100 WBC (ref 0–0.2)
PLATELET # BLD AUTO: 194 K/UL (ref 164–446)
PMV BLD AUTO: 10.9 FL (ref 9–12.9)
POTASSIUM SERPL-SCNC: 4.3 MMOL/L (ref 3.6–5.5)
PROT SERPL-MCNC: 7 G/DL (ref 6–8.2)
RBC # BLD AUTO: 4.37 M/UL (ref 4.2–5.4)
SODIUM SERPL-SCNC: 138 MMOL/L (ref 135–145)
TROPONIN T SERPL-MCNC: 10 NG/L (ref 6–19)
TROPONIN T SERPL-MCNC: 11 NG/L (ref 6–19)
TROPONIN T SERPL-MCNC: 12 NG/L (ref 6–19)
WBC # BLD AUTO: 8.3 K/UL (ref 4.8–10.8)

## 2024-03-16 PROCEDURE — G0378 HOSPITAL OBSERVATION PER HR: HCPCS

## 2024-03-16 PROCEDURE — 99223 1ST HOSP IP/OBS HIGH 75: CPT | Performed by: INTERNAL MEDICINE

## 2024-03-16 PROCEDURE — 93005 ELECTROCARDIOGRAM TRACING: CPT | Performed by: EMERGENCY MEDICINE

## 2024-03-16 PROCEDURE — 700111 HCHG RX REV CODE 636 W/ 250 OVERRIDE (IP): Performed by: NURSE PRACTITIONER

## 2024-03-16 PROCEDURE — 93005 ELECTROCARDIOGRAM TRACING: CPT

## 2024-03-16 PROCEDURE — 84484 ASSAY OF TROPONIN QUANT: CPT | Mod: 91

## 2024-03-16 PROCEDURE — 80053 COMPREHEN METABOLIC PANEL: CPT

## 2024-03-16 PROCEDURE — 99285 EMERGENCY DEPT VISIT HI MDM: CPT

## 2024-03-16 PROCEDURE — 71045 X-RAY EXAM CHEST 1 VIEW: CPT

## 2024-03-16 PROCEDURE — 96372 THER/PROPH/DIAG INJ SC/IM: CPT

## 2024-03-16 PROCEDURE — 36415 COLL VENOUS BLD VENIPUNCTURE: CPT

## 2024-03-16 PROCEDURE — 85025 COMPLETE CBC W/AUTO DIFF WBC: CPT

## 2024-03-16 RX ORDER — ASPIRIN 81 MG/1
324 TABLET, CHEWABLE ORAL EVERY EVENING
Status: DISCONTINUED | OUTPATIENT
Start: 2024-03-16 | End: 2024-03-16

## 2024-03-16 RX ORDER — HEPARIN SODIUM 5000 [USP'U]/ML
5000 INJECTION, SOLUTION INTRAVENOUS; SUBCUTANEOUS EVERY 8 HOURS
Status: DISCONTINUED | OUTPATIENT
Start: 2024-03-16 | End: 2024-03-17 | Stop reason: HOSPADM

## 2024-03-16 RX ORDER — AMINOPHYLLINE 25 MG/ML
100 INJECTION, SOLUTION INTRAVENOUS
Status: DISCONTINUED | OUTPATIENT
Start: 2024-03-16 | End: 2024-03-17 | Stop reason: HOSPADM

## 2024-03-16 RX ORDER — REGADENOSON 0.08 MG/ML
0.4 INJECTION, SOLUTION INTRAVENOUS
Status: COMPLETED | OUTPATIENT
Start: 2024-03-16 | End: 2024-03-17

## 2024-03-16 RX ORDER — NITROGLYCERIN 0.4 MG/1
0.4 TABLET SUBLINGUAL
Status: DISCONTINUED | OUTPATIENT
Start: 2024-03-16 | End: 2024-03-17 | Stop reason: HOSPADM

## 2024-03-16 RX ORDER — LISINOPRIL 2.5 MG/1
2.5 TABLET ORAL
Status: DISCONTINUED | OUTPATIENT
Start: 2024-03-17 | End: 2024-03-17 | Stop reason: HOSPADM

## 2024-03-16 RX ORDER — MORPHINE SULFATE 4 MG/ML
2-4 INJECTION INTRAVENOUS
Status: DISCONTINUED | OUTPATIENT
Start: 2024-03-16 | End: 2024-03-17 | Stop reason: HOSPADM

## 2024-03-16 RX ORDER — ASPIRIN 325 MG
325 TABLET ORAL EVERY EVENING
Status: DISCONTINUED | OUTPATIENT
Start: 2024-03-16 | End: 2024-03-16

## 2024-03-16 RX ORDER — HYDRALAZINE HYDROCHLORIDE 20 MG/ML
10 INJECTION INTRAMUSCULAR; INTRAVENOUS EVERY 6 HOURS PRN
Status: DISCONTINUED | OUTPATIENT
Start: 2024-03-16 | End: 2024-03-17 | Stop reason: HOSPADM

## 2024-03-16 RX ORDER — ASPIRIN 81 MG/1
81 TABLET ORAL DAILY
Status: DISCONTINUED | OUTPATIENT
Start: 2024-03-17 | End: 2024-03-17 | Stop reason: HOSPADM

## 2024-03-16 RX ORDER — ASPIRIN 300 MG/1
300 SUPPOSITORY RECTAL EVERY EVENING
Status: DISCONTINUED | OUTPATIENT
Start: 2024-03-16 | End: 2024-03-16

## 2024-03-16 RX ADMIN — HEPARIN SODIUM 5000 UNITS: 5000 INJECTION, SOLUTION INTRAVENOUS; SUBCUTANEOUS at 21:35

## 2024-03-16 ASSESSMENT — FIBROSIS 4 INDEX: FIB4 SCORE: 1.96

## 2024-03-16 ASSESSMENT — ENCOUNTER SYMPTOMS
WHEEZING: 0
WEIGHT LOSS: 0
MYALGIAS: 0
CONSTIPATION: 0
BACK PAIN: 0
PALPITATIONS: 0
CHILLS: 0
DEPRESSION: 0
HEADACHES: 0
ORTHOPNEA: 0
NAUSEA: 0
COUGH: 0
EYES NEGATIVE: 1
HEARTBURN: 0
SHORTNESS OF BREATH: 0
FEVER: 0
SINUS PAIN: 0
SORE THROAT: 0
ABDOMINAL PAIN: 0
DIZZINESS: 0
NERVOUS/ANXIOUS: 0
VOMITING: 0
DIARRHEA: 0
FLANK PAIN: 0

## 2024-03-16 ASSESSMENT — PAIN DESCRIPTION - PAIN TYPE
TYPE: ACUTE PAIN

## 2024-03-16 ASSESSMENT — LIFESTYLE VARIABLES
TOTAL SCORE: 0
ON A TYPICAL DAY WHEN YOU DRINK ALCOHOL HOW MANY DRINKS DO YOU HAVE: 1
ALCOHOL_USE: YES
AVERAGE NUMBER OF DAYS PER WEEK YOU HAVE A DRINK CONTAINING ALCOHOL: 0
DOES PATIENT WANT TO STOP DRINKING: NO
HAVE PEOPLE ANNOYED YOU BY CRITICIZING YOUR DRINKING: NO
TOTAL SCORE: 0
CONSUMPTION TOTAL: POSITIVE
HOW MANY TIMES IN THE PAST YEAR HAVE YOU HAD 5 OR MORE DRINKS IN A DAY: 1
HAVE YOU EVER FELT YOU SHOULD CUT DOWN ON YOUR DRINKING: NO
EVER FELT BAD OR GUILTY ABOUT YOUR DRINKING: NO
EVER HAD A DRINK FIRST THING IN THE MORNING TO STEADY YOUR NERVES TO GET RID OF A HANGOVER: NO
TOTAL SCORE: 0

## 2024-03-16 ASSESSMENT — PATIENT HEALTH QUESTIONNAIRE - PHQ9
1. LITTLE INTEREST OR PLEASURE IN DOING THINGS: NOT AT ALL
2. FEELING DOWN, DEPRESSED, IRRITABLE, OR HOPELESS: NOT AT ALL
SUM OF ALL RESPONSES TO PHQ9 QUESTIONS 1 AND 2: 0

## 2024-03-16 NOTE — ED PROVIDER NOTES
ED Provider Note    CHIEF COMPLAINT  Chief Complaint   Patient presents with    Chest Pain     FROM [facility]. STAFF  REPORTED TO EMS THAT SHE WAS CLUTCHES CHEST AND STATED THAT SHE HAD CHEST PRESSURE.        EXTERNAL RECORDS REVIEWED  Outpatient Notes   geriatric medicine    HPI/ROS  LIMITATION TO HISTORY   None  OUTSIDE HISTORIAN(S):  None    Lindy Palmer is a 88 y.o. female who presents here for evaluation of chest pain.  Patient states that earlier this morning, she had about a few minutes episode of substernal chest pain.  She states that it came and went, did not radiate to her back, she had no diaphoresis, shortness breath, or vomiting.  Patient states that she was feeling totally fine.  She has no other medical complaints at this time, she denies any history of the same.  She has no abdominal pain, headache, or leg pain or swelling.    PAST MEDICAL HISTORY   has a past medical history of Acquired hallux valgus, Arthritis of knee, Atherosclerosis of aorta (HCC), DM (diabetes mellitus), type 2 (HCC), Fatty liver, History of extraction of renal calculus, History of left breast cancer, HTN (hypertension), Immunity to hepatitis A virus determined by serologic test, Major depressive disorder, recurrent episode, in full remission (HCC), Neuritis, Obesity (BMI 30.0-34.9), Obstructive sleep apnea of adult, Osteopenia, Pancreatic cyst, Pancreatic cyst, Urinary incontinence, and Vitamin D deficiency.    SURGICAL HISTORY  patient denies any surgical history    FAMILY HISTORY  History reviewed. No pertinent family history.    SOCIAL HISTORY  Social History     Tobacco Use    Smoking status: Unknown    Smokeless tobacco: Not on file   Substance and Sexual Activity    Alcohol use: Not on file    Drug use: Not on file    Sexual activity: Not on file       CURRENT MEDICATIONS  Home Medications    **Home medications have not yet been reviewed for this encounter**         ALLERGIES  Allergies   Allergen Reactions     "Trazodone Palpitations       PHYSICAL EXAM  VITAL SIGNS: BP (!) 175/77   Pulse 64   Temp 36.1 °C (97 °F) (Temporal)   Resp (!) 27   Ht 1.575 m (5' 2\")   Wt 79.4 kg (175 lb)   SpO2 94%   BMI 32.01 kg/m²    Constitutional: Well developed, well nourished. No acute distress.  HEENT: Normocephalic, atraumatic. Posterior pharynx clear and moist.  Eyes:  EOMI. Normal sclera.  Neck: Supple, Full range of motion, nontender.  Chest/Pulmonary: clear to ausculation. Symmetrical expansion.   Cardio: Regular rate and rhythm with no murmur.   Abdomen: Soft, nontender. No peritoneal signs. No guarding.  Musculoskeletal: No deformity, S1 edema, neurovascular intact.   Neuro: Clear speech, appropriate, cooperative, cranial nerves II-XII grossly intact.  Psych: Normal mood and affect      DIAGNOSTIC STUDIES / PROCEDURES  Results for orders placed or performed during the hospital encounter of 03/16/24   CBC with Differential   Result Value Ref Range    WBC 8.3 4.8 - 10.8 K/uL    RBC 4.37 4.20 - 5.40 M/uL    Hemoglobin 13.9 12.0 - 16.0 g/dL    Hematocrit 41.1 37.0 - 47.0 %    MCV 94.1 81.4 - 97.8 fL    MCH 31.8 27.0 - 33.0 pg    MCHC 33.8 32.2 - 35.5 g/dL    RDW 44.5 35.9 - 50.0 fL    Platelet Count 194 164 - 446 K/uL    MPV 10.9 9.0 - 12.9 fL    Neutrophils-Polys 60.10 44.00 - 72.00 %    Lymphocytes 30.00 22.00 - 41.00 %    Monocytes 7.60 0.00 - 13.40 %    Eosinophils 1.50 0.00 - 6.90 %    Basophils 0.60 0.00 - 1.80 %    Immature Granulocytes 0.20 0.00 - 0.90 %    Nucleated RBC 0.00 0.00 - 0.20 /100 WBC    Neutrophils (Absolute) 4.96 1.82 - 7.42 K/uL    Lymphs (Absolute) 2.48 1.00 - 4.80 K/uL    Monos (Absolute) 0.63 0.00 - 0.85 K/uL    Eos (Absolute) 0.12 0.00 - 0.51 K/uL    Baso (Absolute) 0.05 0.00 - 0.12 K/uL    Immature Granulocytes (abs) 0.02 0.00 - 0.11 K/uL    NRBC (Absolute) 0.00 K/uL   Complete Metabolic Panel (CMP)   Result Value Ref Range    Sodium 138 135 - 145 mmol/L    Potassium 4.3 3.6 - 5.5 mmol/L    Chloride " 102 96 - 112 mmol/L    Co2 22 20 - 33 mmol/L    Anion Gap 14.0 7.0 - 16.0    Glucose 177 (H) 65 - 99 mg/dL    Bun 20 8 - 22 mg/dL    Creatinine 1.05 0.50 - 1.40 mg/dL    Calcium 9.3 8.5 - 10.5 mg/dL    Correct Calcium 9.4 8.5 - 10.5 mg/dL    AST(SGOT) 23 12 - 45 U/L    ALT(SGPT) 20 2 - 50 U/L    Alkaline Phosphatase 61 30 - 99 U/L    Total Bilirubin 0.3 0.1 - 1.5 mg/dL    Albumin 3.9 3.2 - 4.9 g/dL    Total Protein 7.0 6.0 - 8.2 g/dL    Globulin 3.1 1.9 - 3.5 g/dL    A-G Ratio 1.3 g/dL   Troponins NOW   Result Value Ref Range    Troponin T 10 6 - 19 ng/L   Troponins in two (2) hours   Result Value Ref Range    Troponin T 12 6 - 19 ng/L   ESTIMATED GFR   Result Value Ref Range    GFR (CKD-EPI) 51 (A) >60 mL/min/1.73 m 2   EKG   Result Value Ref Range    Report       [facility] Emergency Dept.    Test Date:  2024  Pt Name:    MARY MALDONADO           Department: ER  MRN:        [id number]                      Room:       [facility]  Gender:     Female                       Technician: [id number]  :        [id number]                   Requested By:ER TRIAGE PROTOCOL  Order #:    [id number]                    Reading MD:    Measurements  Intervals                                Axis  Rate:       63                           P:          24  HI:         192                          QRS:        41  QRSD:       102                          T:          61  QT:         422  QTc:        433    Interpretive Statements  Sinus rhythm  Abnrm T, consider ischemia, anterolateral lds  ST elevation, consider anterolateral injury  Artifact in lead(s) I,III,aVL,V1,V3,V4,V5,V6  No previous ECG available for comparison        EKG; normal sinus rhythm at a rate of 63.  No ST elevation or ST depression.  QTc is 433.  No previous EKG available.    RADIOLOGY  I have independently interpreted the diagnostic imaging associated with this visit and am waiting the final reading from the radiologist.   My preliminary  interpretation is as follows: See below  Radiologist interpretation:   DX-CHEST-PORTABLE (1 VIEW)   Final Result      1.  Minimal LEFT lung base atelectasis.   2.  No lobar pneumonia or pneumothorax.            COURSE & MEDICAL DECISION MAKING    Pt will be admitted to the hospitalist service.     Pt has a heart score of 4. No hx of stress test in the past.     4:29 PM  Pt will be admitted to the NP CDU.    INITIAL ASSESSMENT, COURSE AND PLAN  Care Narrative: this is 88 year old female here for evaluation of chest pain.  Pt will be admitted to the hospitalist service for further evaluation and rule out.   She has no current pain.      DISPOSITION AND DISCUSSIONS  I have discussed management of the patient with the following physicians and REN's:  hospitalist     FINAL DIAGNOSIS  1. Chest pain, unspecified type           Electronically signed by: Jose Martin Mckeon D.O., 3/16/2024 1:34 PM

## 2024-03-16 NOTE — ED NOTES
Patient placed on purewick and educated on use. Patient verbalizes understanding. No other needs at this time

## 2024-03-16 NOTE — ED NOTES
Pt in bed, connected to pulse ox, bp, and cardiac monitor. Rn introduced self to pt and oriented pt  to room and call light.      Per EMS /memory Care:   DAHLIA Owen [phone]  Melaine Florian [phone]

## 2024-03-16 NOTE — ED TRIAGE NOTES
BIB EMS from [facility] for CP. Per EMS staff reported that pt was clutching chest and described is at chest pressure. Pt denies any pain upon arrival to ED.   Pt given 324 ASA in route.

## 2024-03-17 ENCOUNTER — APPOINTMENT (OUTPATIENT)
Dept: RADIOLOGY | Facility: MEDICAL CENTER | Age: 89
End: 2024-03-17
Attending: NURSE PRACTITIONER
Payer: MEDICARE

## 2024-03-17 ENCOUNTER — PHARMACY VISIT (OUTPATIENT)
Dept: PHARMACY | Facility: MEDICAL CENTER | Age: 89
End: 2024-03-17
Payer: COMMERCIAL

## 2024-03-17 ENCOUNTER — APPOINTMENT (OUTPATIENT)
Dept: CARDIOLOGY | Facility: MEDICAL CENTER | Age: 89
End: 2024-03-17
Attending: NURSE PRACTITIONER
Payer: MEDICARE

## 2024-03-17 VITALS
WEIGHT: 175 LBS | RESPIRATION RATE: 18 BRPM | BODY MASS INDEX: 32.2 KG/M2 | SYSTOLIC BLOOD PRESSURE: 145 MMHG | DIASTOLIC BLOOD PRESSURE: 67 MMHG | TEMPERATURE: 97.8 F | OXYGEN SATURATION: 90 % | HEIGHT: 62 IN | HEART RATE: 71 BPM

## 2024-03-17 PROBLEM — I16.0 HYPERTENSIVE URGENCY: Status: RESOLVED | Noted: 2022-11-02 | Resolved: 2024-03-17

## 2024-03-17 LAB
ANION GAP SERPL CALC-SCNC: 12 MMOL/L (ref 7–16)
BUN SERPL-MCNC: 22 MG/DL (ref 8–22)
CALCIUM SERPL-MCNC: 9.3 MG/DL (ref 8.5–10.5)
CHLORIDE SERPL-SCNC: 105 MMOL/L (ref 96–112)
CHOLEST SERPL-MCNC: 151 MG/DL (ref 100–199)
CO2 SERPL-SCNC: 22 MMOL/L (ref 20–33)
CREAT SERPL-MCNC: 1.12 MG/DL (ref 0.5–1.4)
D DIMER PPP IA.FEU-MCNC: 0.37 UG/ML (FEU) (ref 0–0.5)
EKG IMPRESSION: NORMAL
ERYTHROCYTE [DISTWIDTH] IN BLOOD BY AUTOMATED COUNT: 44 FL (ref 35.9–50)
EST. AVERAGE GLUCOSE BLD GHB EST-MCNC: 166 MG/DL
GFR SERPLBLD CREATININE-BSD FMLA CKD-EPI: 47 ML/MIN/1.73 M 2
GLUCOSE BLD STRIP.AUTO-MCNC: 155 MG/DL (ref 65–99)
GLUCOSE BLD STRIP.AUTO-MCNC: 177 MG/DL (ref 65–99)
GLUCOSE SERPL-MCNC: 245 MG/DL (ref 65–99)
HBA1C MFR BLD: 7.4 % (ref 4–5.6)
HCT VFR BLD AUTO: 39.6 % (ref 37–47)
HDLC SERPL-MCNC: 37 MG/DL
HGB BLD-MCNC: 13.2 G/DL (ref 12–16)
LDLC SERPL CALC-MCNC: 68 MG/DL
LV EJECT FRACT  99904: 60
LV EJECT FRACT MOD 2C 99903: 63.84
LV EJECT FRACT MOD 4C 99902: 55.12
LV EJECT FRACT MOD BP 99901: 58.35
MCH RBC QN AUTO: 31.7 PG (ref 27–33)
MCHC RBC AUTO-ENTMCNC: 33.3 G/DL (ref 32.2–35.5)
MCV RBC AUTO: 95.2 FL (ref 81.4–97.8)
PLATELET # BLD AUTO: 181 K/UL (ref 164–446)
PMV BLD AUTO: 11.1 FL (ref 9–12.9)
POTASSIUM SERPL-SCNC: 3.9 MMOL/L (ref 3.6–5.5)
RBC # BLD AUTO: 4.16 M/UL (ref 4.2–5.4)
SODIUM SERPL-SCNC: 139 MMOL/L (ref 135–145)
TRIGL SERPL-MCNC: 232 MG/DL (ref 0–149)
TSH SERPL DL<=0.005 MIU/L-ACNC: 1.56 UIU/ML (ref 0.38–5.33)
WBC # BLD AUTO: 6.5 K/UL (ref 4.8–10.8)

## 2024-03-17 PROCEDURE — 82962 GLUCOSE BLOOD TEST: CPT | Mod: 91

## 2024-03-17 PROCEDURE — RXMED WILLOW AMBULATORY MEDICATION CHARGE: Performed by: NURSE PRACTITIONER

## 2024-03-17 PROCEDURE — 85379 FIBRIN DEGRADATION QUANT: CPT

## 2024-03-17 PROCEDURE — A9270 NON-COVERED ITEM OR SERVICE: HCPCS | Performed by: NURSE PRACTITIONER

## 2024-03-17 PROCEDURE — 84443 ASSAY THYROID STIM HORMONE: CPT

## 2024-03-17 PROCEDURE — A9270 NON-COVERED ITEM OR SERVICE: HCPCS | Performed by: INTERNAL MEDICINE

## 2024-03-17 PROCEDURE — 700102 HCHG RX REV CODE 250 W/ 637 OVERRIDE(OP): Performed by: NURSE PRACTITIONER

## 2024-03-17 PROCEDURE — 36415 COLL VENOUS BLD VENIPUNCTURE: CPT

## 2024-03-17 PROCEDURE — 99239 HOSP IP/OBS DSCHRG MGMT >30: CPT | Performed by: INTERNAL MEDICINE

## 2024-03-17 PROCEDURE — 85027 COMPLETE CBC AUTOMATED: CPT

## 2024-03-17 PROCEDURE — 93306 TTE W/DOPPLER COMPLETE: CPT

## 2024-03-17 PROCEDURE — 80061 LIPID PANEL: CPT

## 2024-03-17 PROCEDURE — A9502 TC99M TETROFOSMIN: HCPCS

## 2024-03-17 PROCEDURE — 80048 BASIC METABOLIC PNL TOTAL CA: CPT

## 2024-03-17 PROCEDURE — 93005 ELECTROCARDIOGRAM TRACING: CPT | Mod: XE | Performed by: NURSE PRACTITIONER

## 2024-03-17 PROCEDURE — 700111 HCHG RX REV CODE 636 W/ 250 OVERRIDE (IP): Performed by: NURSE PRACTITIONER

## 2024-03-17 PROCEDURE — 93306 TTE W/DOPPLER COMPLETE: CPT | Mod: 26 | Performed by: INTERNAL MEDICINE

## 2024-03-17 PROCEDURE — 83036 HEMOGLOBIN GLYCOSYLATED A1C: CPT

## 2024-03-17 PROCEDURE — G0378 HOSPITAL OBSERVATION PER HR: HCPCS

## 2024-03-17 PROCEDURE — 96372 THER/PROPH/DIAG INJ SC/IM: CPT | Mod: XU

## 2024-03-17 PROCEDURE — 700102 HCHG RX REV CODE 250 W/ 637 OVERRIDE(OP): Performed by: INTERNAL MEDICINE

## 2024-03-17 PROCEDURE — 93010 ELECTROCARDIOGRAM REPORT: CPT | Performed by: INTERNAL MEDICINE

## 2024-03-17 RX ORDER — REGADENOSON 0.08 MG/ML
INJECTION, SOLUTION INTRAVENOUS
Status: DISCONTINUED
Start: 2024-03-17 | End: 2024-03-17 | Stop reason: HOSPADM

## 2024-03-17 RX ORDER — AMLODIPINE BESYLATE 5 MG/1
5 TABLET ORAL
Status: DISCONTINUED | OUTPATIENT
Start: 2024-03-17 | End: 2024-03-17 | Stop reason: HOSPADM

## 2024-03-17 RX ORDER — AMINOPHYLLINE 25 MG/ML
INJECTION, SOLUTION INTRAVENOUS
Status: DISCONTINUED
Start: 2024-03-17 | End: 2024-03-17 | Stop reason: HOSPADM

## 2024-03-17 RX ORDER — AMLODIPINE BESYLATE 5 MG/1
5 TABLET ORAL DAILY
Qty: 90 TABLET | Refills: 0 | Status: SHIPPED | OUTPATIENT
Start: 2024-03-18 | End: 2024-06-16

## 2024-03-17 RX ADMIN — REGADENOSON 0.4 MG: 0.08 INJECTION, SOLUTION INTRAVENOUS at 11:13

## 2024-03-17 RX ADMIN — INSULIN HUMAN 1 UNITS: 100 INJECTION, SOLUTION PARENTERAL at 12:41

## 2024-03-17 RX ADMIN — ASPIRIN 81 MG: 81 TABLET, COATED ORAL at 06:08

## 2024-03-17 RX ADMIN — INSULIN HUMAN 1 UNITS: 100 INJECTION, SOLUTION PARENTERAL at 06:08

## 2024-03-17 RX ADMIN — AMINOPHYLLINE 100 MG: 25 INJECTION, SOLUTION INTRAVENOUS at 11:18

## 2024-03-17 RX ADMIN — AMLODIPINE BESYLATE 5 MG: 5 TABLET ORAL at 07:47

## 2024-03-17 RX ADMIN — LISINOPRIL 2.5 MG: 2.5 TABLET ORAL at 06:08

## 2024-03-17 RX ADMIN — HEPARIN SODIUM 5000 UNITS: 5000 INJECTION, SOLUTION INTRAVENOUS; SUBCUTANEOUS at 06:08

## 2024-03-17 ASSESSMENT — PAIN DESCRIPTION - PAIN TYPE: TYPE: ACUTE PAIN

## 2024-03-17 NOTE — CARE PLAN
The patient is Stable - Low risk of patient condition declining or worsening    Shift Goals  Clinical Goals: stress test, echo  Patient Goals: rest  Family Goals: N/A    Progress made toward(s) clinical / shift goals:    Problem: Knowledge Deficit - Standard  Goal: Patient and family/care givers will demonstrate understanding of plan of care, disease process/condition, diagnostic tests and medications  Description: Target End Date:  1-3 days or as soon as patient condition allows    Document in Patient Education    1.  Patient and family/caregiver oriented to unit, equipment, visitation policy and means for communicating concern  2.  Complete/review Learning Assessment  3.  Assess knowledge level of disease process/condition, treatment plan, diagnostic tests and medications  4.  Explain disease process/condition, treatment plan, diagnostic tests and medications  Outcome: Progressing     Problem: Fall Risk  Goal: Patient will remain free from falls  Description: Target End Date:  Prior to discharge or change in level of care    Document interventions on the Ceronhilda Yanez Fall Risk Assessment    1.  Assess for fall risk factors  2.  Implement fall precautions  Outcome: Progressing       Patient is not progressing towards the following goals:N/A

## 2024-03-17 NOTE — ASSESSMENT & PLAN NOTE
- telemonitor  -Trend cardiac enzymes  -Stress test in AM if enzymes are negative  -Consult cardiology if enzymes rise or if EKG changes   -Echocardiogram  -Check A1C, TSH and Lipid panel  -Hold off on statin, BB until post stress.  -IV morphine/ SL nitro PRN ongoing chest pain

## 2024-03-17 NOTE — PROGRESS NOTES
Patient taken to nuclear medicine accompanied by this RN by violet. Telephone consent completed by this RN and RADHA Alfaro for the stress test after discussing the procedure with the patient's daughter Lexie Wiseman.

## 2024-03-17 NOTE — PROGRESS NOTES
Monitor Summary:  Rhythm: SR  Rate: 61-77  Ectopy: None    0.19/0.07/0.38      Per monitor tech interpretation          None

## 2024-03-17 NOTE — DISCHARGE INSTRUCTIONS
FOLLOW UP ITEMS POST DISCHARGE  Please call [phone] to schedule PCP appointment for patient.    Required specialty appointments include:       Discharge Instructions per TITUS EchevarriaRDinorahNDinorah    -Follow-up with PCP s/p hospitalization  -Continue all home medications  -Additional blood pressure medication of amlodipine 5 mg daily  -Check your blood pressure first thing in the morning      DIET: Cardiac    ACTIVITY: As tolerated    DIAGNOSIS: Chest pain    Return to ER if symptoms persist, chest pain, palpitations, shortness of breath, numbness, tingling, weakness, and high fevers.    Chest Pain, Nonspecific  It is often hard to give a specific diagnosis for the cause of chest pain. There is always a chance that your pain could be related to something serious, like a heart attack or a blood clot in the lungs. You need to follow up with your caregiver for further evaluation. More lab tests or other studies such as X-rays, electrocardiography, stress testing, or cardiac imaging may be needed to find the cause of your pain.  Most of the time, nonspecific chest pain improves within 2 to 3 days with rest and mild pain medicine. For the next few days, avoid physical exertion or activities that bring on pain. Do not smoke. Avoid drinking alcohol. Call your caregiver for routine follow-up as advised.   SEEK IMMEDIATE MEDICAL CARE IF:  You develop increased chest pain or pain that radiates to the arm, neck, jaw, back, or abdomen.   You develop shortness of breath, increased coughing, or you start coughing up blood.   You have severe back or abdominal pain, nausea, or vomiting.   You develop severe weakness, fainting, fever, or chills.   Document Released: 12/18/2006 Document Revised: 03/11/2013 Document Reviewed: 06/06/2008  [employer]® Patient Information ©2013 [employer].

## 2024-03-17 NOTE — PROGRESS NOTES
Pt arrived to unit via stretcher at 1709. Pt oriented to room, unit, and plan of care. Tele-monitor placed and monitor room notified. All questions answered at this time. Call light within reach; fall precautions in place.

## 2024-03-17 NOTE — PROGRESS NOTES
This RN received report from RADHA Chin. Assumed care of patient. Patient A&Ox 3, with normal WOB on RA. Patient denies chest pain. Patient updated on plan of care, no further needs at this time. Bed in low and locked position, call light within reach.

## 2024-03-17 NOTE — PROGRESS NOTES
2 RN Skin Assessment Completed by Elsy, RN and Lelo RN.     Head: WDL  Ears: WDL  Nose: WDL  Mouth: WDL  Neck: WDL  Breasts/Chest: WDL  Shoulder Blades: WDL  Spine: WDL  (R) Arm/Elbow/hand: WDL  (L) Arm/Elbow/hand: WDL  Abdomen:WDL  Groin: WDL, barrier wipes used  Sacrum/Coccyx/Buttocks: blanching, slight redness  (R) Leg: WDL  (L) Leg: WDL  (R) Heel/Foot/Toe: blanching  (L) Heel/Foot/Toe: blanching              Devices in place: BP Cuff and Pulse Ox     Interventions in place: Pillows     Possible skin injury found: No     Pictures uploaded into Epic: N/A  Wound Consult Placed: N/A

## 2024-03-17 NOTE — DISCHARGE SUMMARY
Discharge Summary    CHIEF COMPLAINT ON ADMISSION  Chief Complaint   Patient presents with    Chest Pain     FROM [facility] CARE. STAFF  REPORTED TO EMS THAT SHE WAS CLUTCHES CHEST AND STATED THAT SHE HAD CHEST PRESSURE.        Reason for Admission  ems     Admission Date  3/16/2024    CODE STATUS  Full Code    HPI & HOSPITAL COURSE    Ms. Lindy Palmer is a 88 y.o. female with a past medical history of HTN, diabetes type 2, depression, sleep apnea, and vitamin D deficiency, memory loss who presented to the ED on 3/16/2024 with complaints of chest pain.     Patient reports nonradiating substernal chest pain that started this morning and lasted for about 15 minutes. She denies any associated diaphoresis, shortness of breath, dizziness, lightheadedness, nausea, palpitations. She reports having similar chest pain, but not within the last year or more. She denies recent cold or flulike symptoms. She denies cough, congestion, fever or chills. She has been eating and drinking normally, denies GI or  complaints.     In ER, CMP is significant for blood glucose 177, GFR 51, otherwise unremarkable.  CBC is unremarkable.  Troponin x 2 are normal.  Chest x-ray shows minimal left lung base atelectasis, but otherwise no abnormal findings. Blood pressure elevated to 200/96, but otherwise vitals are stable.    During this hospitalization, cardiac stress test was obtained which noted no evidence of significant jeopardized viable myocardium or prior myocardial infarction with normal left ventricular size, ejection fraction, and wall motion.  An echocardiogram was also obtained which noted normal left ventricular systolic function with LVEF approximately 55-60%, grade 2 diastolic dysfunction, RV is normal in size and systolic function.  Patient also noted to be on hypertensive urgency which amlodipine 5 mg was added bringing her blood pressure down to the 140s-150s.    Patient seen and examined prior to being discharged.   Discussed with patient results, however, patient unable to obtain a lot of information.  Discussed case with patient's daughter who is the main caretaker.  Patient to resume all home medications in addition to new blood pressure medication.  Patient to follow-up with her PCP.  All questions and concerns answered prior to being discharged.  Patient discharged home.    Therefore, she is discharged in good and stable condition to home with close outpatient follow-up.    The patient recovered much more quickly than anticipated on admission.    Discharge Date  03/17/24      FOLLOW UP ITEMS POST DISCHARGE  Please call [phone] to schedule PCP appointment for patient.    Required specialty appointments include:       Discharge Instructions per TITUS EchevarriaRADONAY    -Follow-up with PCP s/p hospitalization  -Continue all home medications  -Additional blood pressure medication of amlodipine 5 mg daily  -Check your blood pressure first thing in the morning      DIET: Cardiac    ACTIVITY: As tolerated    DIAGNOSIS: Chest pain    Return to ER if symptoms persist, chest pain, palpitations, shortness of breath, numbness, tingling, weakness, and high fevers.      DISCHARGE DIAGNOSES  Principal Problem:    Acute chest pain (POA: Yes)  Active Problems:    Diabetes mellitus type 2, noninsulin dependent (HCC) (POA: Yes)  Resolved Problems:    Hypertensive urgency (POA: Yes)      FOLLOW UP    Alka Holder, NINO.P.R.N.  [address] [facility] [id number] [phone]    Schedule an appointment as soon as possible for a visit in 1 week(s)        MEDICATIONS ON DISCHARGE     Medication List        START taking these medications        Instructions   amLODIPine 5 MG Tabs  Start taking on: March 18, 2024  Commonly known as: Norvasc   Take 1 Tablet by mouth every day for 90 days.  Dose: 5 mg            CONTINUE taking these medications        Instructions   Acetaminophen Extra Strength 500 MG Tabs   TAKE 2 TABS (1000MG)  BY MOUTH EVERY 8 HOURS AS NEEDED FOR PAIN (DAUGHTER TO SUPPLY)     FreeStyle Spring 2 Sensor Misc   1 Each every 14 days.  Dose: 1 Each     glipiZIDE 5 MG Tabs  Commonly known as: Glucotrol   Doctor's comments: Start 1/2 tab (2.5mg) PO BID x 1 week, then increase to 1 tab PO BID  Take 1 Tablet by mouth see administration instructions.  Dose: 5 mg     lisinopril 2.5 MG Tabs  Commonly known as: Prinivil   Take 1 Tablet by mouth every day.  Dose: 2.5 mg     metFORMIN 500 MG Tabs  Commonly known as: Glucophage   Take 1 Tablet by mouth 2 times a day with meals.  Dose: 500 mg     multivitamin Tabs   Take 1 Tablet by mouth every day.  Dose: 1 Tablet     NON SPECIFIED   Apply 1 Each topically 3 times a day. * anti itch wipe to edgar area*  Dose: 1 Each     NON SPECIFIED   Take 1 Each by mouth every day. * ningxia red packet*  Disolve in 2 oz of water  Dose: 1 Each     NON SPECIFIED   Apply 1 Application topically 3 times a day. * A&D ointment *  Apply to Edgar area*  Dose: 1 Application     polyethylene glycol 3350 17 GM/SCOOP Powd  Commonly known as: Miralax   MIX 17G IN 8OZ OF H20 AND TAKE BY MOUTH EVERY DAY AS NEEDED FOR CONSTIPATION     Vitamin A&D Oint   Apply 1 Application topically 3 times a day. * apply to edgar area*  Dose: 1 Application     VITAMIN D3 PO   Take 1 Tablet by mouth every day.  Dose: 1 Tablet     Ylang-Ylang Oil Fragrance Oil   Place 1 Drop on the skin every day. * apply to wrist*  Dose: 1 Drop              Allergies  Allergies   Allergen Reactions    Trazodone Palpitations       DIET  Orders Placed This Encounter   Procedures    Diet NPO Restrict to: Sips with Medications     Standing Status:   Standing     Number of Occurrences:   1     Order Specific Question:   Diet NPO Restrict to:     Answer:   Sips with Medications [3]       ACTIVITY  As tolerated.  Weight bearing as tolerated    CONSULTATIONS  NONE    PROCEDURES  NONE    IMAGING    NM-CARDIAC STRESS TEST   Final Result      EC-ECHOCARDIOGRAM  COMPLETE W/O CONT         DX-CHEST-PORTABLE (1 VIEW)   Final Result      1.  Minimal LEFT lung base atelectasis.   2.  No lobar pneumonia or pneumothorax.            LABORATORY  Lab Results   Component Value Date    SODIUM 139 03/17/2024    POTASSIUM 3.9 03/17/2024    CHLORIDE 105 03/17/2024    CO2 22 03/17/2024    GLUCOSE 245 (H) 03/17/2024    BUN 22 03/17/2024    CREATININE 1.12 03/17/2024        Lab Results   Component Value Date    WBC 6.5 03/17/2024    HEMOGLOBIN 13.2 03/17/2024    HEMATOCRIT 39.6 03/17/2024    PLATELETCT 181 03/17/2024

## 2024-03-17 NOTE — HOSPITAL COURSE
Ms. Lindy Palmer is a 88 y.o. female with a past medical history of HTN, diabetes type 2, depression, sleep apnea, and vitamin D deficiency, memory loss who presented to the ED on 3/16/2024 with complaints of chest pain.     Patient reports nonradiating substernal chest pain that started this morning and lasted for about 15 minutes. She denies any associated diaphoresis, shortness of breath, dizziness, lightheadedness, nausea, palpitations. She reports having similar chest pain, but not within the last year or more. She denies recent cold or flulike symptoms. She denies cough, congestion, fever or chills. She has been eating and drinking normally, denies GI or  complaints.     In ER, CMP is significant for blood glucose 177, GFR 51, otherwise unremarkable.  CBC is unremarkable.  Troponin x 2 are normal.  Chest x-ray shows minimal left lung base atelectasis, but otherwise no abnormal findings. Blood pressure elevated to 200/96, but otherwise vitals are stable.    During this hospitalization, cardiac stress test was obtained which noted no evidence of significant jeopardized viable myocardium or prior myocardial infarction with normal left ventricular size, ejection fraction, and wall motion.  An echocardiogram was also obtained which noted normal left ventricular systolic function with LVEF approximately 55-60%, grade 2 diastolic dysfunction, RV is normal in size and systolic function.  Patient also noted to be on hypertensive urgency which amlodipine 5 mg was added bringing her blood pressure down to the 140s-150s.    Patient seen and examined prior to being discharged.  Discussed with patient results, however, patient unable to obtain a lot of information.  Discussed case with patient's daughter who is the main caretaker.  Patient to resume all home medications in addition to new blood pressure medication.  Patient to follow-up with her PCP.  All questions and concerns answered prior to being discharged.   Patient discharged home.

## 2024-03-17 NOTE — H&P
Hospital Medicine History & Physical Note    Date of Service  3/16/2024    Primary Care Physician  ELISEO Bradford.    Code Status  Full Code    Chief Complaint  Chief Complaint   Patient presents with    Chest Pain     FROM [facility]. STAFF  REPORTED TO EMS THAT SHE WAS CLUTCHES CHEST AND STATED THAT SHE HAD CHEST PRESSURE.        History of Presenting Illness  Lindy Palmer is a 88 y.o. female with a past medical history of HTN, diabetes type 2, depression, sleep apnea, and vitamin D deficiency who presented to the ED on 3/16/2024 with complaints of chest pain. Patient reports nonradiating substernal chest pain that started this morning and lasted for about 15 minutes. She denies any associated diaphoresis, shortness of breath, dizziness, lightheadedness, nausea, palpitations. She reports having similar chest pain, but not within the last year or more. She denies recent cold or flulike symptoms. She denies cough, congestion, fever or chills. She has been eating and drinking normally, denies GI or  complaints.    CMP is significant for blood glucose 177, GFR 51, otherwise unremarkable.  CBC is unremarkable.  Troponin x 2 are normal.  Chest x-ray shows minimal left lung base atelectasis, but otherwise no abnormal findings. Blood pressure elevated to 200/96, but otherwise vitals are stable.    I discussed the plan of care with patient and ERP .    Review of Systems  Review of Systems   Constitutional:  Negative for chills, fever, malaise/fatigue and weight loss.   HENT:  Negative for congestion, sinus pain, sore throat and tinnitus.    Eyes: Negative.    Respiratory:  Negative for cough, shortness of breath and wheezing.    Cardiovascular:  Positive for chest pain. Negative for palpitations, orthopnea and leg swelling.   Gastrointestinal:  Negative for abdominal pain, constipation, diarrhea, heartburn, nausea and vomiting.   Genitourinary:  Negative for dysuria, flank pain, frequency, hematuria and  urgency.   Musculoskeletal:  Negative for back pain and myalgias.   Skin:  Negative for itching and rash.   Neurological:  Negative for dizziness and headaches.   Psychiatric/Behavioral:  Negative for depression. The patient is not nervous/anxious.        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    Surgical History   has no past surgical history on file.     Family History  family history is not on file.   Family history reviewed with patient. There is no family history that is pertinent to the chief complaint.     Social History   reports that she has never smoked. She has never used smokeless tobacco. She reports that she does not currently use alcohol. She reports that she does not use drugs.    Allergies  Allergies   Allergen Reactions    Trazodone Palpitations       Medications  Prior to Admission Medications   Prescriptions Last Dose Informant Patient Reported? Taking?   Acetaminophen Extra Strength 500 MG Tab 3/16/2024 at 0700 MAR from Other Facility No Yes   Sig: TAKE 2 TABS (1000MG) BY MOUTH EVERY 8 HOURS AS NEEDED FOR PAIN (DAUGHTER TO SUPPLY)   [place] Oil Fragrance (YLANG-YLANG OIL FRAGRANCE) Oil 3/16/2024 at 0700 MAR from Other Facility Yes Yes   Sig: Place 1 Drop on the skin every day. * apply to wrist*   Cholecalciferol (VITAMIN D3 PO) 3/16/2024 at 0700 MAR from Other Facility Yes Yes   Sig: Take 1 Tablet by mouth every day.   Continuous Blood Gluc Sensor (FREESTYLE SALVADOR 2 SENSOR) [facility] [place] at n/a MAR from Other Facility No No   Si Each every 14 days.   NON SPECIFIED 3/16/2024 at 0700 MAR from Other Facility Yes Yes   Sig: Apply 1 Each topically 3 times a day. * anti itch wipe to edgar area*   NON SPECIFIED 3/16/2024 at 0700 MAR from Other Facility Yes Yes   Sig: Take 1 Each by mouth every day. * ningxia red packet*  Disolve in 2 oz of water   NON SPECIFIED 3/16/2024 at 0700 MAR from Other Facility Yes Yes   Sig: Apply 1 Application topically 3 times a day. * A&D ointment *  Apply to Edgar area*   Vitamins A & D (VITAMIN A&D) Ointment 3/16/2024 at 0700 MAR from Other Facility Yes Yes   Sig: Apply 1 Application topically 3 times a day. * apply to edgar area*   glipiZIDE (GLUCOTROL) 5 MG Tab 3/16/2024 at 0800 MAR from Other Facility No Yes   Sig: Take 1 Tablet by mouth see administration instructions.   lisinopril (PRINIVIL) 2.5 MG Tab 3/16/2024 at 0700 MAR from Other Facility No Yes   Sig: Take 1 Tablet by mouth every day.   metFORMIN (GLUCOPHAGE) 500 MG Tab 3/16/2024 at 0800 MAR from Other Facility No Yes   Sig: Take 1 Tablet by mouth 2 times a day with meals.   multivitamin Tab 3/16/2024 at 0700 MAR from Other Facility No Yes   Sig: Take 1 Tablet by mouth every day.   polyethylene glycol 3350 (MIRALAX) 17 GM/SCOOP Powder unknown at unknown MAR from Other Facility No No   Sig: MIX 17G IN 8OZ OF H20 AND TAKE BY MOUTH EVERY DAY AS NEEDED FOR CONSTIPATION      Facility-Administered Medications: None       Physical Exam  Temp:  [36.1 °C (97 °F)-37 °C (98.6 °F)] 36.7 °C (98 °F)  Pulse:  [63-76] 76  Resp:  [16-27] 18  BP: (152-200)/(69-96) 152/69  SpO2:  [90 %-95 %] 90 %  Blood Pressure : (!) 189/76   Temperature: 37 °C (98.6 °F)   Pulse: 65   Respiration: 16   Pulse Oximetry: 93 %       Physical Exam  Vitals and nursing note reviewed.   Constitutional:       General: She is not in acute distress.     Appearance: She is not ill-appearing.   HENT:      Head: Normocephalic.      Nose: Nose normal. No  "congestion or rhinorrhea.      Mouth/Throat:      Pharynx: Oropharynx is clear.   Eyes:      Conjunctiva/sclera: Conjunctivae normal.   Cardiovascular:      Rate and Rhythm: Normal rate and regular rhythm.      Pulses: Normal pulses.      Heart sounds: Normal heart sounds.   Pulmonary:      Effort: Pulmonary effort is normal. No respiratory distress.      Breath sounds: Normal breath sounds. No wheezing, rhonchi or rales.   Abdominal:      General: Bowel sounds are normal. There is no distension.      Tenderness: There is no abdominal tenderness. There is no guarding.   Musculoskeletal:         General: Normal range of motion.      Cervical back: Normal range of motion and neck supple.      Right lower leg: No edema.      Left lower leg: No edema.   Skin:     General: Skin is warm and dry.      Capillary Refill: Capillary refill takes less than 2 seconds.      Findings: No lesion or rash.   Neurological:      Mental Status: She is alert and oriented to person, place, and time.   Psychiatric:         Mood and Affect: Mood normal.         Behavior: Behavior normal.         Laboratory:  Recent Labs     03/16/24  1301   WBC 8.3   RBC 4.37   HEMOGLOBIN 13.9   HEMATOCRIT 41.1   MCV 94.1   MCH 31.8   MCHC 33.8   RDW 44.5   PLATELETCT 194   MPV 10.9     Recent Labs     03/16/24  1301   SODIUM 138   POTASSIUM 4.3   CHLORIDE 102   CO2 22   GLUCOSE 177*   BUN 20   CREATININE 1.05   CALCIUM 9.3     Recent Labs     03/16/24  1301   ALTSGPT 20   ASTSGOT 23   ALKPHOSPHAT 61   TBILIRUBIN 0.3   GLUCOSE 177*         No results for input(s): \"NTPROBNP\" in the last 72 hours.      Recent Labs     03/16/24  1301 03/16/24  1516   TROPONINT 10 12       Imaging:  DX-CHEST-PORTABLE (1 VIEW)   Final Result      1.  Minimal LEFT lung base atelectasis.   2.  No lobar pneumonia or pneumothorax.      EC-ECHOCARDIOGRAM COMPLETE W/O CONT    (Results Pending)   NM-CARDIAC STRESS TEST    (Results Pending)       EKG 3/16/24  Measurements   Intervals   "                             Axis   Rate:       63                           P:          24   OH:         192                          QRS:    41   QRSD:    102                          T:          61   QT:          422   QTc:        433     Interpretive Statements   Sinus rhythm   Abnrm T, consider ischemia, anterolateral lds   ST elevation, consider anterolateral injury     ASSESSMENT/PLAN:  * Acute chest pain- (present on admission)  Assessment & Plan  - telemonitor  -Trend cardiac enzymes  -Stress test in AM if enzymes are negative  -Consult cardiology if enzymes rise or if EKG changes   -Echocardiogram  -Check A1C, TSH and Lipid panel  -Hold off on statin, BB until post stress.  -IV morphine/ SL nitro PRN ongoing chest pain    Hypertensive urgency- (present on admission)  Assessment & Plan  SBP up to 200.   - resume lisinopril  - hydralazine 10 mg IV prn    Diabetes mellitus type 2, noninsulin dependent (HCC)- (present on admission)  Assessment & Plan  .  -Hold oral DM meds  - accucheck ac/hs with low dose ssi coverage  - DM diet    Justification for Admission Status  I anticipate this patient is appropriate for observation status at this time because requires further evaluation of acute chest pain, monitoring overnight for arrhythmia, as well as stress test and echo in the morning    Patient will need a Telemetry bed on MEDICAL service. The need is secondary to ACS workup secondary to acute chest pain.    VTE prophylaxis: SCDs/TEDs and heparin ppx

## 2024-03-17 NOTE — PROGRESS NOTES
[facility] will return today upon the reading of the echocardiogram. No questions from Elizabeth at this time.

## 2024-03-17 NOTE — ED NOTES
Med rec updated and complete. Allergies reviewed .  Rizwan called and stated that the printer was broken. Facility emailed the current MARS to this writer.  Updated med rec per MARS received VIA email.  No anticoagulants or antiplatelets noted.  No outpatient antibiotics noted.      Home pharmacy  [facility] = [phone]

## 2024-03-18 NOTE — PROGRESS NOTES
Patient dressed, medications given to daughter, IV removed, and AVS reviewed with the daughter regarding the patient's stay. RONI Mar reviewed the stress test and echo and the patient was cleared for discharge.

## 2024-03-18 NOTE — PROGRESS NOTES
Patient discharged with daughter back to [place]. Patient escorted via wheelchair to daughters car by unit clerk.

## 2024-03-22 PROBLEM — I10 HTN (HYPERTENSION): Status: ACTIVE | Noted: 2024-03-22

## 2024-04-25 PROBLEM — E11.21 DIABETIC KIDNEY DISEASE (HCC): Status: ACTIVE | Noted: 2024-04-25

## 2024-04-25 PROBLEM — I50.30 DIASTOLIC HEART FAILURE (HCC): Status: ACTIVE | Noted: 2024-04-25

## 2024-06-05 PROBLEM — R60.9 EDEMA: Status: ACTIVE | Noted: 2024-06-05

## 2024-07-08 ENCOUNTER — APPOINTMENT (OUTPATIENT)
Dept: RADIOLOGY | Facility: MEDICAL CENTER | Age: 89
End: 2024-07-08
Attending: EMERGENCY MEDICINE
Payer: MEDICARE

## 2024-07-08 ENCOUNTER — HOSPITAL ENCOUNTER (EMERGENCY)
Facility: MEDICAL CENTER | Age: 89
End: 2024-07-08
Attending: EMERGENCY MEDICINE
Payer: MEDICARE

## 2024-07-08 VITALS
BODY MASS INDEX: 34.48 KG/M2 | WEIGHT: 187.39 LBS | DIASTOLIC BLOOD PRESSURE: 60 MMHG | OXYGEN SATURATION: 92 % | HEIGHT: 62 IN | TEMPERATURE: 98.1 F | SYSTOLIC BLOOD PRESSURE: 126 MMHG | RESPIRATION RATE: 19 BRPM | HEART RATE: 82 BPM
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
## 2024-07-08 LAB
ALBUMIN SERPL BCP-MCNC: 4 G/DL (ref 3.2–4.9)
ALBUMIN/GLOB SERPL: 1.5 G/DL
ALP SERPL-CCNC: 73 U/L (ref 30–99)
ALT SERPL-CCNC: 22 U/L (ref 2–50)
ANION GAP SERPL CALC-SCNC: 14 MMOL/L (ref 7–16)
AST SERPL-CCNC: 28 U/L (ref 12–45)
BASOPHILS # BLD AUTO: 0.4 % (ref 0–1.8)
BASOPHILS # BLD: 0.04 K/UL (ref 0–0.12)
BILIRUB SERPL-MCNC: 0.5 MG/DL (ref 0.1–1.5)
BUN SERPL-MCNC: 21 MG/DL (ref 8–22)
CALCIUM ALBUM COR SERPL-MCNC: 10.2 MG/DL (ref 8.5–10.5)
CALCIUM SERPL-MCNC: 10.2 MG/DL (ref 8.5–10.5)
CHLORIDE SERPL-SCNC: 103 MMOL/L (ref 96–112)
CO2 SERPL-SCNC: 21 MMOL/L (ref 20–33)
CREAT SERPL-MCNC: 1.02 MG/DL (ref 0.5–1.4)
EKG IMPRESSION: NORMAL
EKG IMPRESSION: NORMAL
EOSINOPHIL # BLD AUTO: 0.03 K/UL (ref 0–0.51)
EOSINOPHIL NFR BLD: 0.3 % (ref 0–6.9)
ERYTHROCYTE [DISTWIDTH] IN BLOOD BY AUTOMATED COUNT: 42 FL (ref 35.9–50)
GFR SERPLBLD CREATININE-BSD FMLA CKD-EPI: 53 ML/MIN/1.73 M 2
GLOBULIN SER CALC-MCNC: 2.6 G/DL (ref 1.9–3.5)
GLUCOSE SERPL-MCNC: 271 MG/DL (ref 65–99)
HCT VFR BLD AUTO: 41.1 % (ref 37–47)
HGB BLD-MCNC: 14.2 G/DL (ref 12–16)
IMM GRANULOCYTES # BLD AUTO: 0.03 K/UL (ref 0–0.11)
IMM GRANULOCYTES NFR BLD AUTO: 0.3 % (ref 0–0.9)
LYMPHOCYTES # BLD AUTO: 1.08 K/UL (ref 1–4.8)
LYMPHOCYTES NFR BLD: 11.7 % (ref 22–41)
MAGNESIUM SERPL-MCNC: 1.5 MG/DL (ref 1.5–2.5)
MCH RBC QN AUTO: 32.1 PG (ref 27–33)
MCHC RBC AUTO-ENTMCNC: 34.5 G/DL (ref 32.2–35.5)
MCV RBC AUTO: 92.8 FL (ref 81.4–97.8)
MONOCYTES # BLD AUTO: 0.6 K/UL (ref 0–0.85)
MONOCYTES NFR BLD AUTO: 6.5 % (ref 0–13.4)
NEUTROPHILS # BLD AUTO: 7.42 K/UL (ref 1.82–7.42)
NEUTROPHILS NFR BLD: 80.8 % (ref 44–72)
NRBC # BLD AUTO: 0 K/UL
NRBC BLD-RTO: 0 /100 WBC (ref 0–0.2)
PLATELET # BLD AUTO: 165 K/UL (ref 164–446)
PMV BLD AUTO: 11.4 FL (ref 9–12.9)
POTASSIUM SERPL-SCNC: 4.3 MMOL/L (ref 3.6–5.5)
PROT SERPL-MCNC: 6.6 G/DL (ref 6–8.2)
RBC # BLD AUTO: 4.43 M/UL (ref 4.2–5.4)
SODIUM SERPL-SCNC: 138 MMOL/L (ref 135–145)
WBC # BLD AUTO: 9.2 K/UL (ref 4.8–10.8)

## 2024-07-08 PROCEDURE — 36415 COLL VENOUS BLD VENIPUNCTURE: CPT

## 2024-07-08 PROCEDURE — 93005 ELECTROCARDIOGRAM TRACING: CPT | Performed by: EMERGENCY MEDICINE

## 2024-07-08 PROCEDURE — 85025 COMPLETE CBC W/AUTO DIFF WBC: CPT

## 2024-07-08 PROCEDURE — 83735 ASSAY OF MAGNESIUM: CPT

## 2024-07-08 PROCEDURE — 93005 ELECTROCARDIOGRAM TRACING: CPT

## 2024-07-08 PROCEDURE — 80053 COMPREHEN METABOLIC PANEL: CPT

## 2024-07-08 PROCEDURE — 70450 CT HEAD/BRAIN W/O DYE: CPT

## 2024-07-08 PROCEDURE — 73501 X-RAY EXAM HIP UNI 1 VIEW: CPT | Mod: RT

## 2024-07-08 PROCEDURE — 99285 EMERGENCY DEPT VISIT HI MDM: CPT

## 2024-07-08 ASSESSMENT — FIBROSIS 4 INDEX: FIB4 SCORE: 2.53

## 2024-07-16 PROBLEM — G31.9 CEREBRAL ATROPHY (HCC): Status: ACTIVE | Noted: 2024-07-16

## 2024-07-16 PROBLEM — N18.31 CKD STAGE 3A, GFR 45-59 ML/MIN: Status: ACTIVE | Noted: 2023-08-10
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
## 2024-07-17 PROBLEM — R06.2: Status: ACTIVE | Noted: 2024-07-17

## 2024-08-21 PROBLEM — E11.40 DIABETIC NEUROPATHY (HCC): Status: ACTIVE | Noted: 2024-08-21

## 2025-01-27 PROBLEM — S09.90XA HEAD INJURY: Status: RESOLVED | Noted: 2025-01-27 | Resolved: 2025-01-27

## 2025-01-27 PROBLEM — S09.90XA HEAD INJURY: Status: ACTIVE | Noted: 2025-01-27

## 2025-05-10 ENCOUNTER — APPOINTMENT (OUTPATIENT)
Dept: RADIOLOGY | Facility: MEDICAL CENTER | Age: OVER 89
End: 2025-05-10
Attending: EMERGENCY MEDICINE
Payer: MEDICARE

## 2025-05-10 ENCOUNTER — HOSPITAL ENCOUNTER (EMERGENCY)
Facility: MEDICAL CENTER | Age: OVER 89
End: 2025-05-10
Attending: EMERGENCY MEDICINE
Payer: MEDICARE

## 2025-05-10 VITALS
SYSTOLIC BLOOD PRESSURE: 115 MMHG | BODY MASS INDEX: 34.41 KG/M2 | HEART RATE: 64 BPM | OXYGEN SATURATION: 91 % | HEIGHT: 62 IN | WEIGHT: 187 LBS | RESPIRATION RATE: 16 BRPM | DIASTOLIC BLOOD PRESSURE: 56 MMHG | TEMPERATURE: 97.3 F

## 2025-05-10 DIAGNOSIS — S09.90XA CLOSED HEAD INJURY, INITIAL ENCOUNTER: ICD-10-CM
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
## 2025-05-10 DIAGNOSIS — S70.02XA CONTUSION OF LEFT HIP, INITIAL ENCOUNTER: ICD-10-CM

## 2025-05-10 PROCEDURE — 72170 X-RAY EXAM OF PELVIS: CPT

## 2025-05-10 PROCEDURE — 72125 CT NECK SPINE W/O DYE: CPT

## 2025-05-10 PROCEDURE — 99284 EMERGENCY DEPT VISIT MOD MDM: CPT

## 2025-05-10 PROCEDURE — 73552 X-RAY EXAM OF FEMUR 2/>: CPT | Mod: LT

## 2025-05-10 PROCEDURE — 70450 CT HEAD/BRAIN W/O DYE: CPT

## 2025-05-10 PROCEDURE — 71045 X-RAY EXAM CHEST 1 VIEW: CPT

## 2025-05-10 ASSESSMENT — FIBROSIS 4 INDEX: FIB4 SCORE: 3.22

## 2025-05-10 NOTE — ED PROVIDER NOTES
ED Provider Note    CHIEF COMPLAINT  Chief Complaint   Patient presents with    GLF     Pt BIB EMS from [facility], per EMS pt was attempting to stand up and slipped on her own stool causing her to fall. Unknown head strike, -thinners  Pt had diffuse neck and left hip pain.   Pt A/Ox1-2 at baseline       EXTERNAL RECORDS REVIEWED  4/14/2025: Outpatient visit: Type 2 diabetes and dementia    HPI/ROS  LIMITATION TO HISTORY   Select: Dementia  OUTSIDE HISTORIAN(S):  EMS    Lindy Palmer is a 89 y.o. female who presents as a code TBI, comes in by ambulance.  Was at her memory care facility, slipped and poop, struck her head and has left hip pain.  Administered fentanyl prehospital.  The patient really does not provide any sort of meaningful history, no history of anticoagulation, no history is available otherwise at this time.    PAST MEDICAL HISTORY   has a past medical history of Acquired hallux valgus, Arthritis of knee, Atherosclerosis of aorta (HCC), DM (diabetes mellitus), type 2 (HCC), Fatty liver, History of extraction of renal calculus, History of left breast cancer, HTN (hypertension) (3/22/2024), Immunity to hepatitis A virus determined by serologic test, Major depressive disorder, recurrent episode, in full remission (HCC), Neuritis, Obesity (BMI 30.0-34.9), Obstructive sleep apnea of adult, Osteopenia, Pancreatic cyst, Pancreatic cyst, Urinary incontinence, and Vitamin D deficiency.    SURGICAL HISTORY  patient denies any surgical history    FAMILY HISTORY  No family history on file.    SOCIAL HISTORY  Social History     Tobacco Use    Smoking status: Never    Smokeless tobacco: Never   Vaping Use    Vaping status: Never Used   Substance and Sexual Activity    Alcohol use: Not Currently    Drug use: Never    Sexual activity: Not Currently       CURRENT MEDICATIONS  Home Medications    **Home medications have not yet been reviewed for this encounter**       Audit from Redirected Encounters   "  **Home medications have not yet been reviewed for this encounter**         ALLERGIES  Allergies   Allergen Reactions    Trazodone Palpitations       PHYSICAL EXAM  VITAL SIGNS: BP (!) 149/67   Pulse 62   Temp 36.6 °C (97.8 °F) (Temporal)   Resp 14   Ht 1.575 m (5' 2\")   Wt 84.8 kg (187 lb)   SpO2 94%   BMI 34.20 kg/m²    Constitutional: An alert patient in no acute distress.   HENT: Head is atraumatic.  Neck: Non tender, full range of motion.  No obvious focal tenderness.  Thorax: No respiratory distress.  Lungs are clear to auscultation with symmetric air movement, regular cardiac rhythm  Abdomen: Soft, with no tenderness.  Extremities/Musculoskeletal: No obvious deformity involving the left hip, she does seem to be uncomfortable with passive range of motion.  Normal perfusion of all 4 extremities without other evidence of trauma otherwise.  Neurologic: Alert. No focal deficits observed.        RADIOLOGY/PROCEDURES   I have independently interpreted the diagnostic imaging associated with this visit and am waiting the final reading from the radiologist.   My preliminary interpretation is as follows: No ICH    Radiologist interpretation:  DX-PELVIS-1 OR 2 VIEWS   Final Result      1.  No acute fracture or dislocation is identified.      2.  Osteoarthritis.      DX-FEMUR-2+ LEFT   Final Result      No radiographic evidence of acute traumatic injury.      DX-CHEST-PORTABLE (1 VIEW)   Final Result      1.  Ill-defined opacifications in each lung have increased compared to the prior radiograph. This could indicate worsening of pulmonary edema or inflammation.      2.  Opacifications and volume loss in the left lung base are again noted.         CT-CSPINE WITHOUT PLUS RECONS   Final Result      1.  Moderate multilevel degenerative change of cervical spine.   2.  No fracture or subluxation.      CT-HEAD W/O   Final Result      1.  Diffuse atrophy and white matter changes.   2.  No acute intracranial hemorrhage or " territorial infarct.   3.  Chronic paranasal sinus disease.                      COURSE & MEDICAL DECISION MAKING    ASSESSMENT, COURSE AND PLAN  Care Narrative: This is an 89-year-old female with a slip and fall at her memory care facility, her baseline dementia, no obvious evidence of head trauma.  There is suspicion of left hip injury, seems to have some but mild discomfort with range of motion but she also has some fentanyl.  X-rays will be obtained.  Head CT and neck CT.  She will be reevaluated.      CT head is negative for ICH, CT of the cervical spine is unremarkable.  X-rays of the femur and hip reveal no obvious fracture.  Chest x-ray is obtained, noted by the radiologist to have ill-defined opacifications which could indicate pulmonary edema or inflammation although clinically the patient has had no reports of coughing, she is not hypoxic, she is not tachycardic or ill-appearing so I do not think this is pneumonia or any concerning pulmonary pathology.  At this point we have ambulated her with a walker and she ambulates without difficulty.  She is discharged back to her memory care facility.       FINAL DIAGNOSIS  1. Fall from ground level    2. Closed head injury, initial encounter    3. Contusion of left hip, initial encounter         Electronically signed by: Ozzy Naranjo M.D., 5/10/2025 1:22 PM

## 2025-05-10 NOTE — ED NOTES
Rounded on pt. Pt asleep with even rise and fall of chest visible. No signs of distress at this time. Call light within reach.

## 2025-05-10 NOTE — ED TRIAGE NOTES
Chief Complaint   Patient presents with    GLF     Pt BIB EMS from [facility], per EMS pt was attempting to stand up and slipped on her own stool causing her to fall. Unknown head strike, -thinners  Pt had diffuse neck and left hip pain.   Pt A/Ox1-2 at baseline     Pt BIB EMS for above complaint. Pt activated as TBI and evaluated by Dr. Naranjo at the charge desk. Pt received total of 100 mcg fentanyl prior to arrival.     Pt transported to CT then blue 16. Report to Carol GARCIA.

## 2025-05-10 NOTE — ED NOTES
Pt brought back from CT scan.  Pt covered in stool.  Bed bath performed, pt changed into gown and new linens applied underneath pt.  Pt provided with warm blankets.  Bed alarm in place.  Call light within reach.

## 2025-05-11 NOTE — ED NOTES
Pt has discharge orders. Pt and [facility] EMTs educated on discharge instructions.  Verbalized understanding.  PIV removed. Pt transported to [facility].  Transfer paperwork, belongings and pt's POLST given to [facility]. [facility] staff called and notified that pt cleared for discharge and is being transported back.

## 2025-05-11 NOTE — DISCHARGE PLANNING
Transportation set up with [facility] for Pt to return to:[facility]     [address] [id number].  D/C packet given to Carol Douglas RN

## 2025-05-11 NOTE — ED NOTES
Pt asleep in [facility] with even rise and fall of chest visible. No signs of distress at this time. Call light within reach.